# Patient Record
Sex: FEMALE | Race: WHITE | NOT HISPANIC OR LATINO | Employment: OTHER | URBAN - METROPOLITAN AREA
[De-identification: names, ages, dates, MRNs, and addresses within clinical notes are randomized per-mention and may not be internally consistent; named-entity substitution may affect disease eponyms.]

---

## 2018-12-07 ENCOUNTER — HOSPITAL ENCOUNTER (OUTPATIENT)
Facility: MEDICAL CENTER | Age: 58
End: 2018-12-07

## 2021-04-12 ENCOUNTER — APPOINTMENT (RX ONLY)
Dept: URBAN - METROPOLITAN AREA CLINIC 35 | Facility: CLINIC | Age: 61
Setting detail: DERMATOLOGY
End: 2021-04-12

## 2021-04-12 DIAGNOSIS — D18.0 HEMANGIOMA: ICD-10-CM

## 2021-04-12 DIAGNOSIS — D485 NEOPLASM OF UNCERTAIN BEHAVIOR OF SKIN: ICD-10-CM

## 2021-04-12 DIAGNOSIS — Z71.89 OTHER SPECIFIED COUNSELING: ICD-10-CM

## 2021-04-12 DIAGNOSIS — L72.8 OTHER FOLLICULAR CYSTS OF THE SKIN AND SUBCUTANEOUS TISSUE: ICD-10-CM

## 2021-04-12 DIAGNOSIS — D22 MELANOCYTIC NEVI: ICD-10-CM

## 2021-04-12 DIAGNOSIS — L81.4 OTHER MELANIN HYPERPIGMENTATION: ICD-10-CM

## 2021-04-12 DIAGNOSIS — L82.1 OTHER SEBORRHEIC KERATOSIS: ICD-10-CM

## 2021-04-12 PROBLEM — D18.01 HEMANGIOMA OF SKIN AND SUBCUTANEOUS TISSUE: Status: ACTIVE | Noted: 2021-04-12

## 2021-04-12 PROBLEM — D48.5 NEOPLASM OF UNCERTAIN BEHAVIOR OF SKIN: Status: ACTIVE | Noted: 2021-04-12

## 2021-04-12 PROBLEM — D22.61 MELANOCYTIC NEVI OF RIGHT UPPER LIMB, INCLUDING SHOULDER: Status: ACTIVE | Noted: 2021-04-12

## 2021-04-12 PROCEDURE — 11102 TANGNTL BX SKIN SINGLE LES: CPT

## 2021-04-12 PROCEDURE — 11103 TANGNTL BX SKIN EA SEP/ADDL: CPT

## 2021-04-12 PROCEDURE — 99203 OFFICE O/P NEW LOW 30 MIN: CPT | Mod: 25

## 2021-04-12 PROCEDURE — ? SUNSCREEN RECOMMENDATIONS

## 2021-04-12 PROCEDURE — ? COUNSELING

## 2021-04-12 PROCEDURE — ? BIOPSY BY SHAVE METHOD

## 2021-04-12 ASSESSMENT — LOCATION SIMPLE DESCRIPTION DERM
LOCATION SIMPLE: CHEST
LOCATION SIMPLE: RIGHT SHOULDER
LOCATION SIMPLE: RIGHT UPPER BACK
LOCATION SIMPLE: RIGHT BREAST

## 2021-04-12 ASSESSMENT — LOCATION DETAILED DESCRIPTION DERM
LOCATION DETAILED: LEFT MEDIAL SUPERIOR CHEST
LOCATION DETAILED: RIGHT MEDIAL BREAST 1-2:00 REGION
LOCATION DETAILED: RIGHT SUPERIOR MEDIAL UPPER BACK
LOCATION DETAILED: RIGHT POSTERIOR SHOULDER

## 2021-04-12 ASSESSMENT — LOCATION ZONE DERM
LOCATION ZONE: ARM
LOCATION ZONE: TRUNK

## 2021-04-12 NOTE — PROCEDURE: BIOPSY BY SHAVE METHOD
Detail Level: Simple
Depth Of Biopsy: dermis
Was A Bandage Applied: Yes
Size Of Lesion In Cm: 0
Biopsy Type: H and E
Biopsy Method: Dermablade
Anesthesia Type: 1% lidocaine with 1:100,000 epinephrine and a 1:12 solution of 8.4% sodium bicarbonate
Anesthesia Volume In Cc: 0.8
Hemostasis: Aluminum Chloride
Wound Care: Petrolatum
Dressing: no dressing applied
Destruction After The Procedure: No
Type Of Destruction Used: Curettage
Curettage Text: The wound bed was treated with curettage after the biopsy was performed.
Electrodesiccation And Curettage Text: The wound bed was treated with electrodesiccation and curettage after the biopsy was
Lab: [id number]
Lab Facility: 
Consent: Written consent was obtained and risks were reviewed including but not limited to scarring, infection, bleeding, scabbing, incomplete removal, nerve damage and allergy to anesthesia.
Post-Care Instructions: I reviewed with the patient in detail post-care instructions. Patient is to keep the biopsy site dry overnight, and then apply white petrolatum twice daily until healed. Patient may apply hydrogen peroxide soaks to remove any crusting.
Notification Instructions: Patient will be notified of biopsy results. However, patient instructed to call the office if not contacted within 2 weeks.
Billing Type: Third-Party Bill
Information: Selecting Yes will display possible errors in your note based on the variables you have selected. This validation is only offered as a suggestion for you. PLEASE NOTE THAT THE VALIDATION TEXT WILL BE REMOVED WHEN YOU FINALIZE YOUR NOTE. IF YOU WANT TO FAX A PRELIMINARY NOTE YOU WILL NEED TO TOGGLE THIS TO 'NO' IF YOU DO NOT WANT IT IN YOUR FAXED NOTE.
Anesthesia Volume In Cc: 1

## 2022-06-20 ENCOUNTER — APPOINTMENT (RX ONLY)
Dept: URBAN - METROPOLITAN AREA CLINIC 35 | Facility: CLINIC | Age: 62
Setting detail: DERMATOLOGY
End: 2022-06-20

## 2022-06-20 DIAGNOSIS — D18.0 HEMANGIOMA: ICD-10-CM

## 2022-06-20 DIAGNOSIS — L82.1 OTHER SEBORRHEIC KERATOSIS: ICD-10-CM

## 2022-06-20 DIAGNOSIS — L57.0 ACTINIC KERATOSIS: ICD-10-CM

## 2022-06-20 DIAGNOSIS — Z71.89 OTHER SPECIFIED COUNSELING: ICD-10-CM

## 2022-06-20 DIAGNOSIS — L72.8 OTHER FOLLICULAR CYSTS OF THE SKIN AND SUBCUTANEOUS TISSUE: ICD-10-CM

## 2022-06-20 DIAGNOSIS — D22 MELANOCYTIC NEVI: ICD-10-CM

## 2022-06-20 DIAGNOSIS — L81.4 OTHER MELANIN HYPERPIGMENTATION: ICD-10-CM

## 2022-06-20 PROBLEM — D18.01 HEMANGIOMA OF SKIN AND SUBCUTANEOUS TISSUE: Status: ACTIVE | Noted: 2022-06-20

## 2022-06-20 PROBLEM — D22.61 MELANOCYTIC NEVI OF RIGHT UPPER LIMB, INCLUDING SHOULDER: Status: ACTIVE | Noted: 2022-06-20

## 2022-06-20 PROCEDURE — ? ADDITIONAL NOTES

## 2022-06-20 PROCEDURE — 17003 DESTRUCT PREMALG LES 2-14: CPT

## 2022-06-20 PROCEDURE — ? COUNSELING

## 2022-06-20 PROCEDURE — ? SUNSCREEN RECOMMENDATIONS

## 2022-06-20 PROCEDURE — ? LIQUID NITROGEN

## 2022-06-20 PROCEDURE — 99213 OFFICE O/P EST LOW 20 MIN: CPT | Mod: 25

## 2022-06-20 PROCEDURE — 17000 DESTRUCT PREMALG LESION: CPT

## 2022-06-20 ASSESSMENT — LOCATION ZONE DERM
LOCATION ZONE: TRUNK
LOCATION ZONE: NOSE
LOCATION ZONE: ARM

## 2022-06-20 ASSESSMENT — LOCATION SIMPLE DESCRIPTION DERM
LOCATION SIMPLE: NOSE
LOCATION SIMPLE: RIGHT SHOULDER
LOCATION SIMPLE: RIGHT UPPER BACK
LOCATION SIMPLE: RIGHT NOSE

## 2022-06-20 ASSESSMENT — LOCATION DETAILED DESCRIPTION DERM
LOCATION DETAILED: RIGHT NASAL SIDEWALL
LOCATION DETAILED: RIGHT SUPERIOR MEDIAL UPPER BACK
LOCATION DETAILED: RIGHT POSTERIOR SHOULDER
LOCATION DETAILED: NASAL DORSUM

## 2022-06-20 NOTE — PROCEDURE: LIQUID NITROGEN
Detail Level: Detailed
Post-Care Instructions: I reviewed with the patient in detail post-care instructions. Patient is to wear sunprotection, and avoid picking at any of the treated lesions. Pt may apply Vaseline to crusted or scabbing areas.
Show Applicator Variable?: Yes
Number Of Freeze-Thaw Cycles: 2 freeze-thaw cycles
Consent: The patient's consent was obtained including but not limited to risks of crusting, scabbing, blistering, scarring, darker or lighter pigmentary change, recurrence, incomplete removal and infection.
Render Post-Care Instructions In Note?: no
Duration Of Freeze Thaw-Cycle (Seconds): 2

## 2022-10-09 ENCOUNTER — APPOINTMENT (OUTPATIENT)
Dept: URGENT CARE | Facility: CLINIC | Age: 62
End: 2022-10-09
Payer: COMMERCIAL

## 2022-10-10 ENCOUNTER — HOSPITAL ENCOUNTER (OUTPATIENT)
Dept: RADIOLOGY | Facility: MEDICAL CENTER | Age: 62
End: 2022-10-10
Attending: NURSE PRACTITIONER
Payer: COMMERCIAL

## 2022-10-10 ENCOUNTER — OFFICE VISIT (OUTPATIENT)
Dept: URGENT CARE | Facility: PHYSICIAN GROUP | Age: 62
End: 2022-10-10
Payer: COMMERCIAL

## 2022-10-10 VITALS
TEMPERATURE: 97.9 F | RESPIRATION RATE: 16 BRPM | HEIGHT: 69 IN | OXYGEN SATURATION: 92 % | HEART RATE: 82 BPM | BODY MASS INDEX: 28.14 KG/M2 | WEIGHT: 190 LBS | DIASTOLIC BLOOD PRESSURE: 60 MMHG | SYSTOLIC BLOOD PRESSURE: 132 MMHG

## 2022-10-10 DIAGNOSIS — J18.9 PNEUMONIA DUE TO INFECTIOUS ORGANISM, UNSPECIFIED LATERALITY, UNSPECIFIED PART OF LUNG: ICD-10-CM

## 2022-10-10 LAB
EXTERNAL QUALITY CONTROL: NORMAL
INT CON NEG: NEGATIVE
INT CON POS: POSITIVE
SARS-COV+SARS-COV-2 AG RESP QL IA.RAPID: NEGATIVE

## 2022-10-10 PROCEDURE — 94640 AIRWAY INHALATION TREATMENT: CPT | Performed by: NURSE PRACTITIONER

## 2022-10-10 PROCEDURE — 71046 X-RAY EXAM CHEST 2 VIEWS: CPT

## 2022-10-10 PROCEDURE — 87426 SARSCOV CORONAVIRUS AG IA: CPT | Performed by: NURSE PRACTITIONER

## 2022-10-10 PROCEDURE — 99204 OFFICE O/P NEW MOD 45 MIN: CPT | Mod: 25 | Performed by: NURSE PRACTITIONER

## 2022-10-10 RX ORDER — ESCITALOPRAM OXALATE 10 MG/1
10 TABLET ORAL DAILY
COMMUNITY

## 2022-10-10 RX ORDER — PREDNISONE 20 MG/1
40 TABLET ORAL DAILY
Qty: 10 TABLET | Refills: 0 | Status: SHIPPED | OUTPATIENT
Start: 2022-10-10 | End: 2022-10-15

## 2022-10-10 RX ORDER — OMEPRAZOLE 40 MG/1
40 CAPSULE, DELAYED RELEASE ORAL DAILY
COMMUNITY
End: 2023-09-14

## 2022-10-10 RX ORDER — ALBUTEROL SULFATE 90 UG/1
2 AEROSOL, METERED RESPIRATORY (INHALATION) EVERY 6 HOURS PRN
Qty: 8.5 G | Refills: 0 | Status: SHIPPED | OUTPATIENT
Start: 2022-10-10 | End: 2023-09-14

## 2022-10-10 RX ORDER — IPRATROPIUM BROMIDE AND ALBUTEROL SULFATE 2.5; .5 MG/3ML; MG/3ML
3 SOLUTION RESPIRATORY (INHALATION) ONCE
Status: COMPLETED | OUTPATIENT
Start: 2022-10-10 | End: 2022-10-10

## 2022-10-10 RX ORDER — AZITHROMYCIN 250 MG/1
TABLET, FILM COATED ORAL
Qty: 6 TABLET | Refills: 0 | Status: SHIPPED | OUTPATIENT
Start: 2022-10-10 | End: 2023-09-14

## 2022-10-10 RX ADMIN — IPRATROPIUM BROMIDE AND ALBUTEROL SULFATE 3 ML: 2.5; .5 SOLUTION RESPIRATORY (INHALATION) at 16:53

## 2022-10-10 NOTE — PROGRESS NOTES
Patient has consented to treatment and for use of patient information for treatment and billing purposes.    Date: 10/10/22     Arrival Mode: Private Vehicle / Ambulatory    Chief Complaint:    Chief Complaint   Patient presents with    Cough     X 6 days with congestion        History of Present Illness: 62 y.o. female  presents to clinic with 6-day history of cough and congestion.  Patient states she does believe that she had fevers at the start of the illness although she is unsure at this time.  Patient does admit to chills body aches and diaphoresis.  Denies any pleuritic-like pain.  Denies chest pain severe shortness of breath or lower leg swelling.  Patient denies any calf pain.  Denies nausea vomiting diarrhea.  Patient has been using over-the-counter cold medications with no alleviation of symptoms.  Admits to wheezing and shortness of breath.  No history of COPD or asthma.      ROS:  As stated in HPI     Pertinent Medical History:    History reviewed. No pertinent past medical history.     Pertinent Surgical History:    History reviewed. No pertinent surgical history.     Current  Medications:  Current Outpatient Medications on File Prior to Visit   Medication Sig Dispense Refill    escitalopram (LEXAPRO) 10 MG Tab Take 10 mg by mouth every day.      omeprazole (PRILOSEC) 40 MG delayed-release capsule Take 40 mg by mouth every day.       No current facility-administered medications on file prior to visit.        Allergies:     Patient has no known allergies.     Social History:   Social History     Socioeconomic History    Marital status:      Spouse name: Not on file    Number of children: Not on file    Years of education: Not on file    Highest education level: Not on file   Occupational History    Not on file   Tobacco Use    Smoking status: Not on file    Smokeless tobacco: Not on file   Substance and Sexual Activity    Alcohol use: Not on file    Drug use: Not on file    Sexual activity: Not  on file   Other Topics Concern    Not on file   Social History Narrative    Not on file     Social Determinants of Health     Financial Resource Strain: Not on file   Food Insecurity: Not on file   Transportation Needs: Not on file   Physical Activity: Not on file   Stress: Not on file   Social Connections: Not on file   Intimate Partner Violence: Not on file   Housing Stability: Not on file        No LMP recorded.       Physical Exam:  Vitals:    10/10/22 1213   BP: 132/60   Pulse: 82   Resp: 16   Temp: 36.6 °C (97.9 °F)   SpO2: 92%        Physical Exam  Vitals reviewed.   Constitutional:       General: She is not in acute distress.     Appearance: Normal appearance. She is well-developed. She is not toxic-appearing.   HENT:      Head: Normocephalic and atraumatic.      Right Ear: Tympanic membrane, ear canal and external ear normal.      Left Ear: Tympanic membrane, ear canal and external ear normal.      Nose: Congestion and rhinorrhea present.      Mouth/Throat:      Lips: Pink.      Mouth: Mucous membranes are moist.      Pharynx: Posterior oropharyngeal erythema present.   Eyes:      General: Lids are normal. Gaze aligned appropriately. No allergic shiner or scleral icterus.     Extraocular Movements: Extraocular movements intact.      Conjunctiva/sclera: Conjunctivae normal.      Pupils: Pupils are equal, round, and reactive to light.   Cardiovascular:      Rate and Rhythm: Normal rate and regular rhythm.      Pulses:           Radial pulses are 2+ on the right side and 2+ on the left side.      Heart sounds: Normal heart sounds.   Pulmonary:      Effort: Pulmonary effort is normal.      Breath sounds: Examination of the right-upper field reveals wheezing. Examination of the left-upper field reveals wheezing. Examination of the right-middle field reveals wheezing. Examination of the left-middle field reveals wheezing. Examination of the right-lower field reveals decreased breath sounds and rhonchi.  Examination of the left-lower field reveals decreased breath sounds and rhonchi. Decreased breath sounds, wheezing and rhonchi present.   Musculoskeletal:      Right lower leg: No edema.      Left lower leg: No edema.   Lymphadenopathy:      Cervical: No cervical adenopathy.   Skin:     General: Skin is warm.      Capillary Refill: Capillary refill takes less than 2 seconds.      Coloration: Skin is not cyanotic or pale.      Findings: No rash.   Neurological:      Mental Status: She is alert.      Gait: Gait is intact.   Psychiatric:         Behavior: Behavior normal. Behavior is cooperative.        Diagnostics:    Covid 19 negative     DX-CHEST-2 VIEWS    Result Date: 10/10/2022  10/10/2022 1:04 PM HISTORY/REASON FOR EXAM:  Cough, chest congestion TECHNIQUE/EXAM DESCRIPTION AND NUMBER OF VIEWS: Two views of the chest. COMPARISON:  None. FINDINGS: LUNGS: Clear. No effusions. PNEUMOTHORAX: None. LINES AND TUBES: None. MEDIASTINUM: No cardiomegaly. MUSCULOSKELETAL STRUCTURES: No acute displaced fracture. Prior cholecystectomy.     No acute cardiopulmonary abnormality.       Medical Decision Making:  I personally reviewed prior external notes and test results pertinent to today's visit.  Patient has had pneumonia and acute respiratory failure prior.  Shared decision-making was utilized with patient did develop treatment plan and clinic course. Charlie, 62 y.o. female 6-day history of cough.  Discussed differentials to include viral URI, bronchitis and/or pneumonia.  Did give in clinic DuoNeb which did improve patient's subjective work of breathing.  Did also slightly improved breath sounds.  Although chest x-ray is negative for acute cardiopulmonary abnormality due to exam findings will treat for presumed pneumonia at this time.  Will send for prednisone azithromycin as well as an albuterol inhaler.  Encourage deep breathing exercises.    Did advise patient on conservative measures for management of symptoms.   Patient is agreeable to pursue adequate rest, adequate hydration, saltwater gargle and Neti pot for any symptoms of upper respiratory congestion.  Over-the-counter analgesia and antipyretics on a p.r.n. basis as needed for pain and fever.  Did discuss over-the-counter cough medications.      Patient will monitor symptoms closely for worsening and is advised to seek further evaluation the emergency room if alarm signs or symptoms arise.  Patient states understanding and verbalizes agreement with this plan of care.    Plan:    1. Pneumonia due to infectious organism, unspecified laterality, unspecified part of lung    - POCT SARS-COV Antigen PANTERA (Symptomatic only)  - DX-CHEST-2 VIEWS  - ipratropium-albuterol (DUONEB) nebulizer solution  - azithromycin (ZITHROMAX) 250 MG Tab; Take 2 tablets by mouth on day one. Take one tablet by mouth the remaining days until gone  Dispense: 6 Tablet; Refill: 0  - predniSONE (DELTASONE) 20 MG Tab; Take 2 Tablets by mouth every day for 5 days.  Dispense: 10 Tablet; Refill: 0  - albuterol 108 (90 Base) MCG/ACT Aero Soln inhalation aerosol; Inhale 2 Puffs every 6 hours as needed for Shortness of Breath.  Dispense: 8.5 g; Refill: 0         Disposition:  Patient was discharged in stable condition.    Voice Recognition Disclaimer: Portions of this document were created using voice recognition software. The software does have a chance of producing errors of grammar and possibly content. I have made every reasonable attempt to correct obvious errors, but there may be errors of grammar and possibly content that I did not discover before finalizing the documentation.    Ashley Ortiz, A.P.R.N.

## 2022-10-10 NOTE — LETTER
October 10, 2022    To Whom It May Concern:         This is confirmation that Jennifer ABDIAZIZ Mejia attended her scheduled appointment with MAGED Christian on 10/10/22. Please excuse form work 10/10-10/12/22. May return 10/13/22. Pt may still cough but is not thought to be contagious.          If you have any questions please do not hesitate to call me at the phone number listed below.    Sincerely,          WILDER ChristianRJulietteN.  [phone]

## 2022-11-27 ENCOUNTER — HOSPITAL ENCOUNTER (OUTPATIENT)
Dept: RADIOLOGY | Facility: MEDICAL CENTER | Age: 62
End: 2022-11-27
Attending: INTERNAL MEDICINE
Payer: COMMERCIAL

## 2022-11-27 ENCOUNTER — OFFICE VISIT (OUTPATIENT)
Dept: URGENT CARE | Facility: PHYSICIAN GROUP | Age: 62
End: 2022-11-27
Payer: COMMERCIAL

## 2022-11-27 VITALS
HEART RATE: 88 BPM | TEMPERATURE: 97.6 F | DIASTOLIC BLOOD PRESSURE: 74 MMHG | BODY MASS INDEX: 29.62 KG/M2 | SYSTOLIC BLOOD PRESSURE: 122 MMHG | HEIGHT: 69 IN | WEIGHT: 200 LBS | OXYGEN SATURATION: 93 % | RESPIRATION RATE: 16 BRPM

## 2022-11-27 DIAGNOSIS — J20.8 ACUTE BRONCHITIS DUE TO OTHER SPECIFIED ORGANISMS: ICD-10-CM

## 2022-11-27 DIAGNOSIS — J10.1 INFLUENZA A: ICD-10-CM

## 2022-11-27 LAB
EXTERNAL QUALITY CONTROL: NORMAL
FLUAV+FLUBV AG SPEC QL IA: POSITIVE
INT CON NEG: NORMAL
INT CON NEG: NORMAL
INT CON POS: NORMAL
INT CON POS: NORMAL
SARS-COV+SARS-COV-2 AG RESP QL IA.RAPID: NEGATIVE

## 2022-11-27 PROCEDURE — 99213 OFFICE O/P EST LOW 20 MIN: CPT | Performed by: INTERNAL MEDICINE

## 2022-11-27 PROCEDURE — 71046 X-RAY EXAM CHEST 2 VIEWS: CPT

## 2022-11-27 PROCEDURE — 87804 INFLUENZA ASSAY W/OPTIC: CPT | Performed by: INTERNAL MEDICINE

## 2022-11-27 PROCEDURE — 87426 SARSCOV CORONAVIRUS AG IA: CPT | Performed by: INTERNAL MEDICINE

## 2022-11-27 RX ORDER — ESTRADIOL 0.1 MG/D
1 FILM, EXTENDED RELEASE TRANSDERMAL
COMMUNITY

## 2022-11-27 RX ORDER — OSELTAMIVIR PHOSPHATE 75 MG/1
75 CAPSULE ORAL 2 TIMES DAILY
Qty: 10 CAPSULE | Refills: 0 | Status: SHIPPED | OUTPATIENT
Start: 2022-11-27 | End: 2023-09-14

## 2022-11-27 ASSESSMENT — ENCOUNTER SYMPTOMS
CHILLS: 0
SHORTNESS OF BREATH: 1
HEMOPTYSIS: 0
FEVER: 0
SPUTUM PRODUCTION: 1
COUGH: 1
WHEEZING: 0

## 2022-11-27 NOTE — PROGRESS NOTES
Chief Complaint:      HPI:      Jennifer Mejia is a 62 y.o. female with the productive cough since Thursday.  She was diagnosed with acute bronchitis last month and completed a Z-Suleiman.  Her chest x-ray was normal.  She reports that she initially got better after her first treatment but then her cough  has gotten worse.  She denies fevers and chills but reports slight shortness of breath.   She denies a history of COPD and congestive heart failure.          ROS:   Review of Systems   Constitutional:  Negative for chills and fever.   Respiratory:  Positive for cough, sputum production and shortness of breath. Negative for hemoptysis and wheezing.       Past Medical History:  She There are no problems to display for this patient.    Past Surgical History:  She No past surgical history on file.   Allergies:  She Patient has no known allergies.   Current Medications:  She   Current Outpatient Medications:     estradiol (VIVELLE-DOT) 0.1 MG/24HR PATCH BIWEEKLY, Place 1 Patch on the skin two times a week., Disp: , Rfl:     escitalopram (LEXAPRO) 10 MG Tab, Take 10 mg by mouth every day., Disp: , Rfl:     omeprazole (PRILOSEC) 40 MG delayed-release capsule, Take 40 mg by mouth every day. (Patient not taking: Reported on 11/27/2022), Disp: , Rfl:     azithromycin (ZITHROMAX) 250 MG Tab, Take 2 tablets by mouth on day one. Take one tablet by mouth the remaining days until gone (Patient not taking: Reported on 11/27/2022), Disp: 6 Tablet, Rfl: 0    albuterol 108 (90 Base) MCG/ACT Aero Soln inhalation aerosol, Inhale 2 Puffs every 6 hours as needed for Shortness of Breath. (Patient not taking: Reported on 11/27/2022), Disp: 8.5 g, Rfl: 0  Social History:  She   Social History     Socioeconomic History    Marital status:      Spouse name: Not on file    Number of children: Not on file    Years of education: Not on file    Highest education level: Not on file   Occupational History    Not on file   Tobacco Use    Smoking  "status: Not on file    Smokeless tobacco: Not on file   Substance and Sexual Activity    Alcohol use: Not on file    Drug use: Not on file    Sexual activity: Not on file   Other Topics Concern    Not on file   Social History Narrative    Not on file     Social Determinants of Health     Financial Resource Strain: Not on file   Food Insecurity: Not on file   Transportation Needs: Not on file   Physical Activity: Not on file   Stress: Not on file   Social Connections: Not on file   Intimate Partner Violence: Not on file   Housing Stability: Not on file      Family History:   Her No family history on file.     PHYSICAL EXAM:    Vital signs: /74   Pulse 88   Temp 36.4 °C (97.6 °F) (Temporal)   Resp 16   Ht 1.753 m (5' 9\")   Wt 90.7 kg (200 lb)   SpO2 93%   BMI 29.53 kg/m²   Physical Exam  HENT:      Mouth/Throat:      Mouth: Mucous membranes are moist.      Pharynx: Oropharynx is clear.   Eyes:      Extraocular Movements: Extraocular movements intact.      Conjunctiva/sclera: Conjunctivae normal.      Pupils: Pupils are equal, round, and reactive to light.   Cardiovascular:      Rate and Rhythm: Normal rate and regular rhythm.      Pulses: Normal pulses.      Heart sounds: Normal heart sounds.   Pulmonary:      Effort: Pulmonary effort is normal.      Breath sounds: Rales present.      Comments: Positive for rales or fine inspiratory crackles on the right lower chest  Musculoskeletal:      Cervical back: Normal range of motion and neck supple.   Neurological:      Mental Status: She is alert.       Labs:  No results found for: HBA1C   Lab Results   Component Value Date/Time    CHOLSTRLTOT 224 (H) 10/26/2016 0935    TRIGLYCERIDE 129 10/26/2016 0935    HDL 66 10/26/2016 0935     (H) 10/26/2016 0935     No results found for: MICROALBCALC, MALBCRT, MALBEXCR, VCDPTY45, MICROALBUR, MICRALB, UMICROALBUM, MICROALBTIM   Lab Results   Component Value Date/Time    CREATININE 0.75 10/26/2016 09:35 AM       "     ASSESSMENT/PLAN:   1. Influenza A  Patient is positive for influenza A.  Because of her symptoms I am starting her on Tamiflu  Reviewed the side effects of the medication alternatives  Reviewed proper administration  I am also concerned because her oxygen saturation is low I get a chest x-ray to make sure that she does not have bacterial pneumonia on top of the influenza  I will update her on the results of her x-ray      Return if symptoms worsen or fail to improve.       This patient during there office visit was started on new medication.  Side effects of new medications were discussed with the patient today in the office. The patient was supplied paperwork on this new medication.    Red flags discussed and when to RTC or seek care in the ER  Supportive care, differential diagnoses, and indications for immediate follow-up discussed with patient.    Pathogenesis of diagnosis discussed including typical length and natural progression.       Instructed to return to  or nearest emergency department if symptoms fail to improve, for any change in condition, further concerns, or new concerning symptoms.  Patient states understanding of the plan of care and discharge instructions.      Eric Zamora MD, FACE, ECNU  11/27/22

## 2022-11-27 NOTE — LETTER
November 27, 2022    To Whom It May Concern:         This is confirmation that Jennifer Mejia attended her scheduled appointment with Eric Zamora M.D. on 11/27/22.  Please excuse her from work 11/28/2022 to 12/1/2022         If you have any questions please do not hesitate to call me at the phone number listed below.    Sincerely,          Eric Zamora M.D.  [phone]

## 2023-06-27 ENCOUNTER — APPOINTMENT (RX ONLY)
Dept: URBAN - METROPOLITAN AREA CLINIC 35 | Facility: CLINIC | Age: 63
Setting detail: DERMATOLOGY
End: 2023-06-27

## 2023-06-27 DIAGNOSIS — D22 MELANOCYTIC NEVI: ICD-10-CM

## 2023-06-27 DIAGNOSIS — L82.1 OTHER SEBORRHEIC KERATOSIS: ICD-10-CM

## 2023-06-27 DIAGNOSIS — L81.4 OTHER MELANIN HYPERPIGMENTATION: ICD-10-CM

## 2023-06-27 DIAGNOSIS — L82.0 INFLAMED SEBORRHEIC KERATOSIS: ICD-10-CM

## 2023-06-27 DIAGNOSIS — L72.8 OTHER FOLLICULAR CYSTS OF THE SKIN AND SUBCUTANEOUS TISSUE: ICD-10-CM

## 2023-06-27 DIAGNOSIS — D18.0 HEMANGIOMA: ICD-10-CM

## 2023-06-27 DIAGNOSIS — Z71.89 OTHER SPECIFIED COUNSELING: ICD-10-CM

## 2023-06-27 PROBLEM — D22.72 MELANOCYTIC NEVI OF LEFT LOWER LIMB, INCLUDING HIP: Status: ACTIVE | Noted: 2023-06-27

## 2023-06-27 PROBLEM — D18.01 HEMANGIOMA OF SKIN AND SUBCUTANEOUS TISSUE: Status: ACTIVE | Noted: 2023-06-27

## 2023-06-27 PROBLEM — D22.61 MELANOCYTIC NEVI OF RIGHT UPPER LIMB, INCLUDING SHOULDER: Status: ACTIVE | Noted: 2023-06-27

## 2023-06-27 PROCEDURE — ? ADDITIONAL NOTES

## 2023-06-27 PROCEDURE — ? COUNSELING

## 2023-06-27 PROCEDURE — 99213 OFFICE O/P EST LOW 20 MIN: CPT | Mod: 25

## 2023-06-27 PROCEDURE — 17110 DESTRUCTION B9 LES UP TO 14: CPT | Mod: 52

## 2023-06-27 PROCEDURE — ? OBSERVATION AND MEASURE

## 2023-06-27 PROCEDURE — ? SUNSCREEN RECOMMENDATIONS

## 2023-06-27 PROCEDURE — ? LIQUID NITROGEN

## 2023-06-27 ASSESSMENT — LOCATION ZONE DERM
LOCATION ZONE: ARM
LOCATION ZONE: TOE
LOCATION ZONE: TRUNK
LOCATION ZONE: EAR

## 2023-06-27 ASSESSMENT — LOCATION DETAILED DESCRIPTION DERM
LOCATION DETAILED: RIGHT POSTERIOR SHOULDER
LOCATION DETAILED: RIGHT SUPERIOR MEDIAL UPPER BACK
LOCATION DETAILED: RIGHT SCAPHA
LOCATION DETAILED: LEFT MEDIAL 5TH TOE

## 2023-06-27 ASSESSMENT — LOCATION SIMPLE DESCRIPTION DERM
LOCATION SIMPLE: RIGHT SHOULDER
LOCATION SIMPLE: RIGHT EAR
LOCATION SIMPLE: RIGHT UPPER BACK
LOCATION SIMPLE: LEFT 5TH TOE

## 2023-06-27 NOTE — PROCEDURE: LIQUID NITROGEN
Detail Level: Detailed
Show Aperture Variable?: Yes
Number Of Freeze-Thaw Cycles: 2 freeze-thaw cycles
Consent: The patient's consent was obtained including but not limited to risks of crusting, scabbing, blistering, scarring, darker or lighter pigmentary change, recurrence, incomplete removal and infection.
Medical Necessity Information: It is in your best interest to select a reason for this procedure from the list below. All of these items fulfill various CMS LCD requirements except the new and changing color options.
Spray Paint Technique: No
Post-Care Instructions: I reviewed with the patient in detail post-care instructions. Patient is to wear sunprotection, and avoid picking at any of the treated lesions. Pt may apply Vaseline to crusted or scabbing areas.
Medical Necessity Clause: This procedure was medically necessary because the lesions that were treated were:
Spray Paint Text: The liquid nitrogen was applied to the skin utilizing a spray paint frosting technique.

## 2023-08-22 ENCOUNTER — TELEPHONE (OUTPATIENT)
Dept: SCHEDULING | Facility: IMAGING CENTER | Age: 63
End: 2023-08-22

## 2023-09-08 ENCOUNTER — OFFICE VISIT (OUTPATIENT)
Dept: URGENT CARE | Facility: PHYSICIAN GROUP | Age: 63
End: 2023-09-08
Payer: COMMERCIAL

## 2023-09-08 VITALS
SYSTOLIC BLOOD PRESSURE: 118 MMHG | RESPIRATION RATE: 16 BRPM | HEIGHT: 69 IN | DIASTOLIC BLOOD PRESSURE: 64 MMHG | BODY MASS INDEX: 29.44 KG/M2 | OXYGEN SATURATION: 96 % | HEART RATE: 70 BPM | WEIGHT: 198.8 LBS | TEMPERATURE: 97.1 F

## 2023-09-08 DIAGNOSIS — R05.1 ACUTE COUGH: ICD-10-CM

## 2023-09-08 DIAGNOSIS — J06.9 VIRAL URI WITH COUGH: ICD-10-CM

## 2023-09-08 DIAGNOSIS — R68.89 FLU-LIKE SYMPTOMS: ICD-10-CM

## 2023-09-08 LAB
FLUAV RNA SPEC QL NAA+PROBE: NEGATIVE
FLUBV RNA SPEC QL NAA+PROBE: NEGATIVE
RSV RNA SPEC QL NAA+PROBE: NEGATIVE
SARS-COV-2 RNA RESP QL NAA+PROBE: NEGATIVE

## 2023-09-08 PROCEDURE — 0241U POCT CEPHEID COV-2, FLU A/B, RSV - PCR: CPT | Performed by: NURSE PRACTITIONER

## 2023-09-08 PROCEDURE — 3078F DIAST BP <80 MM HG: CPT | Performed by: NURSE PRACTITIONER

## 2023-09-08 PROCEDURE — 99213 OFFICE O/P EST LOW 20 MIN: CPT | Performed by: NURSE PRACTITIONER

## 2023-09-08 PROCEDURE — 3074F SYST BP LT 130 MM HG: CPT | Performed by: NURSE PRACTITIONER

## 2023-09-08 RX ORDER — BENZONATATE 100 MG/1
100 CAPSULE ORAL 3 TIMES DAILY PRN
Qty: 30 CAPSULE | Refills: 0 | Status: SHIPPED | OUTPATIENT
Start: 2023-09-08 | End: 2023-09-20

## 2023-09-08 RX ORDER — M-VIT,TX,IRON,MINS/CALC/FOLIC 27MG-0.4MG
1 TABLET ORAL DAILY
COMMUNITY
End: 2023-09-20

## 2023-09-08 ASSESSMENT — ENCOUNTER SYMPTOMS
SHORTNESS OF BREATH: 0
HEADACHES: 1
SORE THROAT: 1
CARDIOVASCULAR NEGATIVE: 1
SPUTUM PRODUCTION: 0
WHEEZING: 0
GASTROINTESTINAL NEGATIVE: 1
CHILLS: 1
COUGH: 1
SWEATS: 1
FEVER: 0
MYALGIAS: 1

## 2023-09-08 NOTE — PROGRESS NOTES
Subjective     Jennifer Mejia is a 63 y.o. female who presents with Congestion (Sx present x3d.), Cough, Body Aches, and Sweats (Night sweats)            Cough  Associated symptoms include chills, headaches, myalgias, a sore throat and sweats. Pertinent negatives include no fever, shortness of breath or wheezing.   Sweats  Associated symptoms include chills, congestion, coughing, headaches, myalgias and a sore throat. Pertinent negatives include no fever.   States has been experiencing flulike symptoms x3 days.  Denies fever but does have chills, night sweats, body aches, feeling fatigue, dry cough, postnasal drainage.  Taking ibuprofen.  No salt water gargle.  Denies nausea, vomiting, diarrhea.    PMH:  has no past medical history on file.  MEDS:   Current Outpatient Medications:     therapeutic multivitamin-minerals (THERAGRAN-M) Tab, Take 1 Tablet by mouth every day., Disp: , Rfl:     benzonatate (TESSALON) 100 MG Cap, Take 1 Capsule by mouth 3 times a day as needed for Cough., Disp: 30 Capsule, Rfl: 0    estradiol (VIVELLE-DOT) 0.1 MG/24HR PATCH BIWEEKLY, Place 1 Patch on the skin two times a week., Disp: , Rfl:     escitalopram (LEXAPRO) 10 MG Tab, Take 10 mg by mouth every day., Disp: , Rfl:     oseltamivir (TAMIFLU) 75 MG Cap, Take 1 Capsule by mouth 2 times a day. (Patient not taking: Reported on 9/8/2023), Disp: 10 Capsule, Rfl: 0    omeprazole (PRILOSEC) 40 MG delayed-release capsule, Take 40 mg by mouth every day. (Patient not taking: Reported on 11/27/2022), Disp: , Rfl:     azithromycin (ZITHROMAX) 250 MG Tab, Take 2 tablets by mouth on day one. Take one tablet by mouth the remaining days until gone (Patient not taking: Reported on 11/27/2022), Disp: 6 Tablet, Rfl: 0    albuterol 108 (90 Base) MCG/ACT Aero Soln inhalation aerosol, Inhale 2 Puffs every 6 hours as needed for Shortness of Breath. (Patient not taking: Reported on 11/27/2022), Disp: 8.5 g, Rfl: 0  ALLERGIES: No Known Allergies  SURGHX:  "History reviewed. No pertinent surgical history.  SOCHX:  reports that she has never smoked. She has never used smokeless tobacco.  FH: Family history was reviewed, no pertinent findings to report      Review of Systems   Constitutional:  Positive for chills and malaise/fatigue. Negative for fever.   HENT:  Positive for congestion and sore throat.    Respiratory:  Positive for cough. Negative for sputum production, shortness of breath and wheezing.    Cardiovascular: Negative.    Gastrointestinal: Negative.    Musculoskeletal:  Positive for myalgias.   Neurological:  Positive for headaches.   All other systems reviewed and are negative.             Objective     /64 (BP Location: Left arm, Patient Position: Sitting, BP Cuff Size: Adult long)   Pulse 70   Temp 36.2 °C (97.1 °F) (Temporal)   Resp 16   Ht 1.753 m (5' 9\")   Wt 90.2 kg (198 lb 12.8 oz)   SpO2 96%   BMI 29.36 kg/m²      Physical Exam  Vitals reviewed.   Constitutional:       General: She is awake. She is not in acute distress.     Appearance: She is well-developed. She is not ill-appearing, toxic-appearing or diaphoretic.      Comments: Appears fatigued   HENT:      Head: Normocephalic.      Right Ear: Ear canal and external ear normal. A middle ear effusion is present.      Left Ear: Ear canal and external ear normal. A middle ear effusion is present.      Nose: Congestion and rhinorrhea present.      Mouth/Throat:      Lips: Pink.      Mouth: Mucous membranes are dry.      Pharynx: Oropharynx is clear. Uvula midline. No pharyngeal swelling, oropharyngeal exudate, posterior oropharyngeal erythema or uvula swelling.   Eyes:      Conjunctiva/sclera: Conjunctivae normal.   Cardiovascular:      Rate and Rhythm: Normal rate.   Pulmonary:      Effort: Pulmonary effort is normal.      Breath sounds: Normal breath sounds and air entry.      Comments: Dry bronchial cough without bronchospasm    Musculoskeletal:         General: Normal range of " motion.      Cervical back: Normal range of motion and neck supple.   Skin:     General: Skin is warm and dry.   Neurological:      Mental Status: She is alert and oriented to person, place, and time.   Psychiatric:         Attention and Perception: Attention normal.         Mood and Affect: Mood normal.         Speech: Speech normal.         Behavior: Behavior normal. Behavior is cooperative.                             Assessment & Plan        1. Flu-like symptoms    - POCT CoV-2, Flu A/B, RSV by PCR    2. Acute cough    - benzonatate (TESSALON) 100 MG Cap; Take 1 Capsule by mouth 3 times a day as needed for Cough.  Dispense: 30 Capsule; Refill: 0    3. Viral URI with cough        -Maintain hydration/water intake  -May use over the counter Ibuprofen/Tylenol as needed for fever  -May use humidifier/vaporizer at home as needed for dry cough/nasal passages  -Gargle salt water or throat lozenges as needed for throat irritation/dry cough  -Get rest  -May use daily longer acting antihistamine as needed (no decongestant) for any post nasal drainage   -May use saline nasal spray/Flonase as needed to flush any nasal congestion/post nasal drainage   -Monitor for fevers, worse cough, phlegm, shortness of breath, wheeze, chest tightness- need re-evaluation

## 2023-09-14 ENCOUNTER — OFFICE VISIT (OUTPATIENT)
Dept: URGENT CARE | Facility: PHYSICIAN GROUP | Age: 63
End: 2023-09-14
Payer: COMMERCIAL

## 2023-09-14 VITALS
DIASTOLIC BLOOD PRESSURE: 64 MMHG | RESPIRATION RATE: 18 BRPM | HEIGHT: 69 IN | HEART RATE: 84 BPM | OXYGEN SATURATION: 97 % | WEIGHT: 195 LBS | BODY MASS INDEX: 28.88 KG/M2 | SYSTOLIC BLOOD PRESSURE: 104 MMHG | TEMPERATURE: 98.2 F

## 2023-09-14 DIAGNOSIS — F17.200 SMOKER: ICD-10-CM

## 2023-09-14 DIAGNOSIS — J40 BRONCHITIS: ICD-10-CM

## 2023-09-14 PROCEDURE — 3074F SYST BP LT 130 MM HG: CPT | Performed by: PHYSICIAN ASSISTANT

## 2023-09-14 PROCEDURE — 3078F DIAST BP <80 MM HG: CPT | Performed by: PHYSICIAN ASSISTANT

## 2023-09-14 PROCEDURE — 99213 OFFICE O/P EST LOW 20 MIN: CPT | Performed by: PHYSICIAN ASSISTANT

## 2023-09-14 RX ORDER — ALBUTEROL SULFATE 90 UG/1
2 AEROSOL, METERED RESPIRATORY (INHALATION) EVERY 6 HOURS PRN
Qty: 8.5 G | Refills: 0 | Status: SHIPPED | OUTPATIENT
Start: 2023-09-14 | End: 2024-03-20

## 2023-09-14 RX ORDER — PREDNISONE 20 MG/1
40 TABLET ORAL DAILY
Qty: 10 TABLET | Refills: 0 | Status: SHIPPED | OUTPATIENT
Start: 2023-09-14 | End: 2023-09-19

## 2023-09-14 NOTE — PROGRESS NOTES
"Subjective:   Jennifer Mejia is a 63 y.o. female who presents for Congestion (States she was seen here last week and states her cough has gotten worse. )      HPI  The patient presents to the Urgent Care with complaints of a cough onset over 10 days ago.  Was evaluated here in the urgent care on 9/8 and was tested negative for COVID, influenza, RSV.  Treated with Tessalon Perles and recommended symptomatic and supportive care.  Cough has remained the same.  No worsening.  Occasional fatigue and body aches.  Hot and cold chills.  Some diarrhea after eating.  Denies any recent fever, chest pain, SOB, vomiting. Smoked 1/2 pack per day before illness. Smokes 2-3 cigarettes per day now.     No past medical history on file.  No Known Allergies     Objective:     /64 (BP Location: Left arm, Patient Position: Sitting, BP Cuff Size: Adult long)   Pulse 84   Temp 36.8 °C (98.2 °F) (Temporal)   Resp 18   Ht 1.753 m (5' 9\")   Wt 88.5 kg (195 lb)   SpO2 97%   BMI 28.80 kg/m²     Physical Exam  Vitals reviewed.   Constitutional:       General: She is not in acute distress.     Appearance: Normal appearance. She is not ill-appearing or toxic-appearing.   HENT:      Mouth/Throat:      Mouth: Mucous membranes are moist.      Pharynx: Oropharynx is clear.   Eyes:      Conjunctiva/sclera: Conjunctivae normal.   Cardiovascular:      Rate and Rhythm: Normal rate and regular rhythm.      Heart sounds: Normal heart sounds.   Pulmonary:      Effort: Pulmonary effort is normal. No respiratory distress.      Breath sounds: Wheezing (slight expiratory to left upper lung) present. No rhonchi or rales.   Musculoskeletal:      Cervical back: Neck supple.   Skin:     General: Skin is warm and dry.   Neurological:      General: No focal deficit present.      Mental Status: She is alert and oriented to person, place, and time.   Psychiatric:         Mood and Affect: Mood normal.         Behavior: Behavior normal.         Diagnosis and " associated orders:     1. Bronchitis  - predniSONE (DELTASONE) 20 MG Tab; Take 2 Tablets by mouth every day for 5 days.  Dispense: 10 Tablet; Refill: 0  - albuterol 108 (90 Base) MCG/ACT Aero Soln inhalation aerosol; Inhale 2 Puffs every 6 hours as needed for Shortness of Breath.  Dispense: 8.5 g; Refill: 0    2. Smoker       Comments/MDM:     They have a normal pulse oximetry on room air, afebrile, and a normal pulmonary exam. Overall, the patient is very well appearing. I do not feel that this patient would benefit from antibiotics at this time.   Treatment as above  Recommended symptomatic and supportive care at this time that includes plenty of fluids, rest, Tylenol/Ibuprofen for pain/fever, warm salt water gargles for sore throat, OTC cough and decongestant medication, Flonase, nasal saline washes. If no improvement in 5-7 days or any worsening symptoms, recommend returning to the urgent care for re-evaluation.      I personally reviewed prior external notes and test results pertinent to today's visit. Pathogenesis of diagnosis discussed including typical length and natural progression. Supportive care, natural history, differential diagnoses, and indications for immediate follow-up discussed. Patient expresses understanding and agrees to plan. Patient denies any other questions or concerns.     Follow-up with the primary care physician for recheck, reevaluation, and consideration of further management.    Please note that this dictation was created using voice recognition software. I have made a reasonable attempt to correct obvious errors, but I expect that there are errors of grammar and possibly content that I did not discover before finalizing the note.    This note was electronically signed by Bonilla Nicole PA-C

## 2023-09-19 SDOH — HEALTH STABILITY: MENTAL HEALTH
STRESS IS WHEN SOMEONE FEELS TENSE, NERVOUS, ANXIOUS, OR CAN'T SLEEP AT NIGHT BECAUSE THEIR MIND IS TROUBLED. HOW STRESSED ARE YOU?: ONLY A LITTLE

## 2023-09-19 SDOH — ECONOMIC STABILITY: FOOD INSECURITY: WITHIN THE PAST 12 MONTHS, THE FOOD YOU BOUGHT JUST DIDN'T LAST AND YOU DIDN'T HAVE MONEY TO GET MORE.: NEVER TRUE

## 2023-09-19 SDOH — ECONOMIC STABILITY: TRANSPORTATION INSECURITY
IN THE PAST 12 MONTHS, HAS THE LACK OF TRANSPORTATION KEPT YOU FROM MEDICAL APPOINTMENTS OR FROM GETTING MEDICATIONS?: NO

## 2023-09-19 SDOH — ECONOMIC STABILITY: TRANSPORTATION INSECURITY
IN THE PAST 12 MONTHS, HAS LACK OF RELIABLE TRANSPORTATION KEPT YOU FROM MEDICAL APPOINTMENTS, MEETINGS, WORK OR FROM GETTING THINGS NEEDED FOR DAILY LIVING?: NO

## 2023-09-19 SDOH — ECONOMIC STABILITY: INCOME INSECURITY: IN THE LAST 12 MONTHS, WAS THERE A TIME WHEN YOU WERE NOT ABLE TO PAY THE MORTGAGE OR RENT ON TIME?: NO

## 2023-09-19 SDOH — HEALTH STABILITY: PHYSICAL HEALTH: ON AVERAGE, HOW MANY DAYS PER WEEK DO YOU ENGAGE IN MODERATE TO STRENUOUS EXERCISE (LIKE A BRISK WALK)?: 5 DAYS

## 2023-09-19 SDOH — ECONOMIC STABILITY: TRANSPORTATION INSECURITY
IN THE PAST 12 MONTHS, HAS LACK OF TRANSPORTATION KEPT YOU FROM MEETINGS, WORK, OR FROM GETTING THINGS NEEDED FOR DAILY LIVING?: NO

## 2023-09-19 SDOH — ECONOMIC STABILITY: HOUSING INSECURITY
IN THE LAST 12 MONTHS, WAS THERE A TIME WHEN YOU DID NOT HAVE A STEADY PLACE TO SLEEP OR SLEPT IN A SHELTER (INCLUDING NOW)?: NO

## 2023-09-19 SDOH — HEALTH STABILITY: PHYSICAL HEALTH: ON AVERAGE, HOW MANY MINUTES DO YOU ENGAGE IN EXERCISE AT THIS LEVEL?: 60 MIN

## 2023-09-19 SDOH — ECONOMIC STABILITY: FOOD INSECURITY: WITHIN THE PAST 12 MONTHS, YOU WORRIED THAT YOUR FOOD WOULD RUN OUT BEFORE YOU GOT MONEY TO BUY MORE.: NEVER TRUE

## 2023-09-19 SDOH — ECONOMIC STABILITY: HOUSING INSECURITY

## 2023-09-19 SDOH — ECONOMIC STABILITY: INCOME INSECURITY: HOW HARD IS IT FOR YOU TO PAY FOR THE VERY BASICS LIKE FOOD, HOUSING, MEDICAL CARE, AND HEATING?: NOT HARD AT ALL

## 2023-09-19 ASSESSMENT — SOCIAL DETERMINANTS OF HEALTH (SDOH)
HOW OFTEN DO YOU HAVE SIX OR MORE DRINKS ON ONE OCCASION: NEVER
HOW OFTEN DO YOU GET TOGETHER WITH FRIENDS OR RELATIVES?: TWICE A WEEK
HOW OFTEN DO YOU ATTEND CHURCH OR RELIGIOUS SERVICES?: PATIENT DECLINED
WITHIN THE PAST 12 MONTHS, YOU WORRIED THAT YOUR FOOD WOULD RUN OUT BEFORE YOU GOT THE MONEY TO BUY MORE: NEVER TRUE
HOW OFTEN DO YOU ATTEND CHURCH OR RELIGIOUS SERVICES?: PATIENT DECLINED
DO YOU BELONG TO ANY CLUBS OR ORGANIZATIONS SUCH AS CHURCH GROUPS UNIONS, FRATERNAL OR ATHLETIC GROUPS, OR SCHOOL GROUPS?: PATIENT DECLINED
HOW OFTEN DO YOU ATTENT MEETINGS OF THE CLUB OR ORGANIZATION YOU BELONG TO?: PATIENT DECLINED
HOW OFTEN DO YOU ATTENT MEETINGS OF THE CLUB OR ORGANIZATION YOU BELONG TO?: PATIENT DECLINED
DO YOU BELONG TO ANY CLUBS OR ORGANIZATIONS SUCH AS CHURCH GROUPS UNIONS, FRATERNAL OR ATHLETIC GROUPS, OR SCHOOL GROUPS?: PATIENT DECLINED
HOW HARD IS IT FOR YOU TO PAY FOR THE VERY BASICS LIKE FOOD, HOUSING, MEDICAL CARE, AND HEATING?: NOT HARD AT ALL
HOW OFTEN DO YOU GET TOGETHER WITH FRIENDS OR RELATIVES?: TWICE A WEEK
HOW OFTEN DO YOU HAVE A DRINK CONTAINING ALCOHOL: PATIENT DECLINED
HOW MANY DRINKS CONTAINING ALCOHOL DO YOU HAVE ON A TYPICAL DAY WHEN YOU ARE DRINKING: PATIENT DECLINED

## 2023-09-19 ASSESSMENT — LIFESTYLE VARIABLES
AUDIT-C TOTAL SCORE: -1
HOW MANY STANDARD DRINKS CONTAINING ALCOHOL DO YOU HAVE ON A TYPICAL DAY: PATIENT DECLINED
SKIP TO QUESTIONS 9-10: 0
HOW OFTEN DO YOU HAVE A DRINK CONTAINING ALCOHOL: PATIENT DECLINED
HOW OFTEN DO YOU HAVE SIX OR MORE DRINKS ON ONE OCCASION: NEVER

## 2023-09-20 ENCOUNTER — OFFICE VISIT (OUTPATIENT)
Dept: MEDICAL GROUP | Facility: MEDICAL CENTER | Age: 63
End: 2023-09-20
Payer: COMMERCIAL

## 2023-09-20 VITALS
DIASTOLIC BLOOD PRESSURE: 78 MMHG | WEIGHT: 203.48 LBS | TEMPERATURE: 98.3 F | SYSTOLIC BLOOD PRESSURE: 114 MMHG | HEIGHT: 69 IN | HEART RATE: 85 BPM | BODY MASS INDEX: 30.14 KG/M2 | OXYGEN SATURATION: 95 %

## 2023-09-20 DIAGNOSIS — Z72.0 TOBACCO USE: ICD-10-CM

## 2023-09-20 DIAGNOSIS — Z00.00 WELLNESS EXAMINATION: ICD-10-CM

## 2023-09-20 DIAGNOSIS — J01.00 ACUTE NON-RECURRENT MAXILLARY SINUSITIS: ICD-10-CM

## 2023-09-20 PROCEDURE — 3078F DIAST BP <80 MM HG: CPT | Performed by: STUDENT IN AN ORGANIZED HEALTH CARE EDUCATION/TRAINING PROGRAM

## 2023-09-20 PROCEDURE — 99214 OFFICE O/P EST MOD 30 MIN: CPT | Performed by: STUDENT IN AN ORGANIZED HEALTH CARE EDUCATION/TRAINING PROGRAM

## 2023-09-20 PROCEDURE — 3074F SYST BP LT 130 MM HG: CPT | Performed by: STUDENT IN AN ORGANIZED HEALTH CARE EDUCATION/TRAINING PROGRAM

## 2023-09-20 RX ORDER — BENZONATATE 100 MG/1
100 CAPSULE ORAL 3 TIMES DAILY PRN
Qty: 30 CAPSULE | Refills: 0 | Status: SHIPPED | OUTPATIENT
Start: 2023-09-20 | End: 2024-03-20

## 2023-09-20 RX ORDER — AZITHROMYCIN 250 MG/1
250 TABLET, FILM COATED ORAL DAILY
Qty: 6 TABLET | Refills: 0 | Status: SHIPPED | OUTPATIENT
Start: 2023-09-20 | End: 2024-03-20

## 2023-09-20 ASSESSMENT — PATIENT HEALTH QUESTIONNAIRE - PHQ9: CLINICAL INTERPRETATION OF PHQ2 SCORE: 0

## 2023-09-20 NOTE — PROGRESS NOTES
"Subjective:     Chief Complaint   Patient presents with    Establish Care    Cough    Headache    Sinus Problem       HPI:   Jennifer presents today to establish care.  Previous PCP Dr. Pena.    Urgent care follow-up  Patient seen in urgent care 9/8 and 9/14 for flulike symptoms.  9/8 labs negative for COVID, influenza and RSV.  Patient advised to continue with symptomatic treatment.  Patient returned to urgent care 9/14 stating that symptoms have gotten worse.  Patient treated for bronchitis with prednisone, albuterol.    Patient presents today, 6 days later, continuing to have cough, headache, sinus pressure and pain.  Patient feeling slightly improved.  Patient notes that she does continue on Claritin, no improvement in cough or drainage.  Continues to have cough productive of green mucus.    Patient has been under high stress, notes recently her mother passed away and child was getting .  Patient notes she is finally able to rest.      Tobacco use  Patient notes a pack per day history 20 years patient is a pack per day history of approximately 22 years.  Patient notes that she smoked for 20 years, quit for 11 and restarted about 2 years ago.  Patient is interested in quitting.    Menopause   patient does continue to follow with GYN for menopause.  Patient is taking Lexapro 10 mg and estradiol patch.    Patient notes a strong family history of a clotting disorder.  Patient is unsure which clotting disorder.  Patient has never had a blood clot herself.    Mammogram up-to-date-  Pap [id number]  Colonoscopy up-to-date-  Need to request records from GYN.        ROS:  Gen: no fevers/chills  Pulm: no sob, no cough  CV: no chest pain, no palpitations  GI: no nausea/vomiting, no diarrhea      Objective:     Exam:  /78 (BP Location: Left arm, Patient Position: Sitting)   Pulse 85   Temp 36.8 °C (98.3 °F) (Temporal)   Ht 1.753 m (5' 9\")   Wt 92.3 kg (203 lb 7.8 oz)   SpO2 95%   BMI 30.05 kg/m²  " Body mass index is 30.05 kg/m².    Gen: Alert and oriented, No apparent distress.  Neck: Neck is supple without lymphadenopathy.  Lungs: Normal effort, CTA bilaterally, no wheezes, rhonchi, or rales  CV: Regular rate and rhythm. No murmurs, rubs, or gallops.  Ext: No clubbing, cyanosis, edema.      Assessment & Plan:     63 y.o. female with the following -     1. Acute non-recurrent maxillary sinusitis  benzonatate (TESSALON) 100 MG Cap    azithromycin (ZITHROMAX) 250 MG Tab      2. Wellness examination  CBC WITHOUT DIFFERENTIAL    Comp Metabolic Panel    Lipid Profile    TSH WITH REFLEX TO FT4      3. Tobacco use               No follow-ups on file.    Please note that this dictation was created using voice recognition software. I have made every reasonable attempt to correct obvious errors, but I expect that there are errors of grammar and possibly content that I did not discover before finalizing the note.

## 2024-01-12 ENCOUNTER — DOCUMENTATION (OUTPATIENT)
Dept: HEALTH INFORMATION MANAGEMENT | Facility: OTHER | Age: 64
End: 2024-01-12
Payer: COMMERCIAL

## 2024-02-15 ENCOUNTER — OFFICE VISIT (OUTPATIENT)
Dept: URGENT CARE | Facility: PHYSICIAN GROUP | Age: 64
End: 2024-02-15
Payer: COMMERCIAL

## 2024-02-15 VITALS
HEART RATE: 85 BPM | TEMPERATURE: 97.7 F | WEIGHT: 190 LBS | OXYGEN SATURATION: 96 % | SYSTOLIC BLOOD PRESSURE: 118 MMHG | HEIGHT: 69 IN | RESPIRATION RATE: 20 BRPM | BODY MASS INDEX: 28.14 KG/M2 | DIASTOLIC BLOOD PRESSURE: 80 MMHG

## 2024-02-15 DIAGNOSIS — J01.00 ACUTE NON-RECURRENT MAXILLARY SINUSITIS: Primary | ICD-10-CM

## 2024-02-15 PROCEDURE — 3074F SYST BP LT 130 MM HG: CPT | Performed by: PHYSICIAN ASSISTANT

## 2024-02-15 PROCEDURE — 99213 OFFICE O/P EST LOW 20 MIN: CPT | Performed by: PHYSICIAN ASSISTANT

## 2024-02-15 PROCEDURE — 3079F DIAST BP 80-89 MM HG: CPT | Performed by: PHYSICIAN ASSISTANT

## 2024-02-15 RX ORDER — AMOXICILLIN AND CLAVULANATE POTASSIUM 875; 125 MG/1; MG/1
1 TABLET, FILM COATED ORAL 2 TIMES DAILY
Qty: 14 TABLET | Refills: 0 | Status: SHIPPED | OUTPATIENT
Start: 2024-02-15 | End: 2024-02-22

## 2024-02-15 NOTE — PROGRESS NOTES
"Subjective:   Jennifer Mejia is a 63 y.o. female who presents for Sinus Problem (3 weeks)      HPI  The patient presents to the Urgent Care with complaints of possible sinus infection.  Over a month ago she had a viral URI followed by sinus symptoms for about 3 weeks.  Reports of sinus pain and discomfort to her maxillary sinuses, nasal congestion, runny nose, postnasal drip cough.  She has tried Ana pot.  Denies any fever, chills, chest pain, shortness of breath.      No past medical history on file.  No Known Allergies     Objective:     /80   Pulse 85   Temp 36.5 °C (97.7 °F) (Temporal)   Resp 20   Ht 1.753 m (5' 9\")   Wt 86.2 kg (190 lb)   SpO2 96%   BMI 28.06 kg/m²     Physical Exam  Vitals reviewed.   Constitutional:       General: She is not in acute distress.     Appearance: Normal appearance. She is not ill-appearing or toxic-appearing.   HENT:      Right Ear: Tympanic membrane normal.      Left Ear: Tympanic membrane normal.      Nose: Mucosal edema and rhinorrhea present. Rhinorrhea is purulent.      Right Sinus: No maxillary sinus tenderness or frontal sinus tenderness.      Left Sinus: No maxillary sinus tenderness or frontal sinus tenderness.      Mouth/Throat:      Mouth: Mucous membranes are moist.      Pharynx: Oropharynx is clear. No oropharyngeal exudate or posterior oropharyngeal erythema.   Eyes:      Conjunctiva/sclera: Conjunctivae normal.   Cardiovascular:      Rate and Rhythm: Normal rate and regular rhythm.      Heart sounds: Normal heart sounds.   Pulmonary:      Effort: Pulmonary effort is normal.      Breath sounds: Normal breath sounds.   Musculoskeletal:      Cervical back: Neck supple. No rigidity.   Skin:     General: Skin is warm and dry.   Neurological:      General: No focal deficit present.      Mental Status: She is alert and oriented to person, place, and time.   Psychiatric:         Mood and Affect: Mood normal.         Behavior: Behavior normal. "         Diagnosis and associated orders:     1. Acute non-recurrent maxillary sinusitis  - amoxicillin-clavulanate (AUGMENTIN) 875-125 MG Tab; Take 1 Tablet by mouth 2 times a day for 7 days.  Dispense: 14 Tablet; Refill: 0       Comments/MDM:     Patient's presenting symptoms and exam findings are consistent with sinusitis. Due to duration, symptoms, and exam findings, I feel it is appropriate to initiate antibiotic therapy.   Start Augmentin.   I recommend plenty of fluids, Flonase nasal spray, nasal saline washes or janie pot, Mucinex decongestant, Ibuprofen or Tylenol for pain, non-drowsy antihistamine such as Zyrtec or Claritin.        I personally reviewed prior external notes and test results pertinent to today's visit. Pathogenesis of diagnosis discussed including typical length and natural progression. Supportive care, natural history, differential diagnoses, and indications for immediate follow-up discussed. Patient expresses understanding and agrees to plan. Patient denies any other questions or concerns.     Follow-up with the primary care physician for recheck, reevaluation, and consideration of further management.    Please note that this dictation was created using voice recognition software. I have made a reasonable attempt to correct obvious errors, but I expect that there are errors of grammar and possibly content that I did not discover before finalizing the note.    This note was electronically signed by Bonilla Nicole PA-C

## 2024-03-20 ENCOUNTER — RESEARCH ENCOUNTER (OUTPATIENT)
Dept: RESEARCH | Facility: MEDICAL CENTER | Age: 64
End: 2024-03-20

## 2024-03-20 ENCOUNTER — OFFICE VISIT (OUTPATIENT)
Dept: MEDICAL GROUP | Facility: MEDICAL CENTER | Age: 64
End: 2024-03-20
Payer: COMMERCIAL

## 2024-03-20 ENCOUNTER — HOSPITAL ENCOUNTER (OUTPATIENT)
Facility: MEDICAL CENTER | Age: 64
End: 2024-03-20
Attending: FAMILY MEDICINE
Payer: COMMERCIAL

## 2024-03-20 VITALS
BODY MASS INDEX: 29.92 KG/M2 | HEART RATE: 80 BPM | SYSTOLIC BLOOD PRESSURE: 112 MMHG | RESPIRATION RATE: 16 BRPM | HEIGHT: 69 IN | OXYGEN SATURATION: 93 % | WEIGHT: 202 LBS | DIASTOLIC BLOOD PRESSURE: 80 MMHG | TEMPERATURE: 98.3 F

## 2024-03-20 DIAGNOSIS — Z11.59 NEED FOR HEPATITIS C SCREENING TEST: ICD-10-CM

## 2024-03-20 DIAGNOSIS — Z11.4 ENCOUNTER FOR SCREENING FOR HIV: ICD-10-CM

## 2024-03-20 DIAGNOSIS — Z12.31 ENCOUNTER FOR SCREENING MAMMOGRAM FOR BREAST CANCER: ICD-10-CM

## 2024-03-20 DIAGNOSIS — F40.243 ANXIETY WITH FLYING: ICD-10-CM

## 2024-03-20 DIAGNOSIS — Z79.890 POSTMENOPAUSAL HRT (HORMONE REPLACEMENT THERAPY): ICD-10-CM

## 2024-03-20 DIAGNOSIS — E78.5 DYSLIPIDEMIA: ICD-10-CM

## 2024-03-20 DIAGNOSIS — R73.01 ELEVATED FASTING GLUCOSE: ICD-10-CM

## 2024-03-20 DIAGNOSIS — Z91.89 AT RISK FOR BREAST CANCER: ICD-10-CM

## 2024-03-20 DIAGNOSIS — Z87.891 FORMER SMOKER: ICD-10-CM

## 2024-03-20 PROBLEM — Z72.89: Status: ACTIVE | Noted: 2023-09-20

## 2024-03-20 PROCEDURE — 3079F DIAST BP 80-89 MM HG: CPT | Performed by: FAMILY MEDICINE

## 2024-03-20 PROCEDURE — 3074F SYST BP LT 130 MM HG: CPT | Performed by: FAMILY MEDICINE

## 2024-03-20 PROCEDURE — 99214 OFFICE O/P EST MOD 30 MIN: CPT | Performed by: FAMILY MEDICINE

## 2024-03-20 PROCEDURE — 80307 DRUG TEST PRSMV CHEM ANLYZR: CPT

## 2024-03-20 RX ORDER — LORAZEPAM 0.5 MG/1
.5-1 TABLET ORAL 2 TIMES DAILY PRN
Qty: 8 TABLET | Refills: 0 | Status: SHIPPED | OUTPATIENT
Start: 2024-03-20 | End: 2024-03-29

## 2024-03-20 ASSESSMENT — PATIENT HEALTH QUESTIONNAIRE - PHQ9: CLINICAL INTERPRETATION OF PHQ2 SCORE: 0

## 2024-03-20 NOTE — LETTER
[facility]  Vinny Segundo D.O.  [address]  [place] [address]  Fax: [phone]   Authorization for Release/Disclosure of   Protected Health Information   Name: EMMANUEL CHAVEZ : 1960 SSN: [id number]   Address: [address] [place] [address] Phone:    [phone] (home) [phone] (work)   I authorize the entity listed below to release/disclose the PHI below to:   [facility]/Vinny Segundo D.O. and Vinny Segundo D.O.   Provider or Entity Name:  [facility]   Address   City, [facility], Zip:               [address] [place] [address]   Phone:  [phone]      Fax:      [phone]        Reason for request: continuity of care   Information to be released:    [ X ] LAST COLONOSCOPY,  including any PATH REPORT and follow-up  [ X ] LAST FIT/COLOGUARD RESULT [  ] LAST DEXA  [  ] LAST MAMMOGRAM  [  ] LAST PAP  [  ] LAST LABS [  ] RETINA EXAM REPORT  [  ] IMMUNIZATION RECORDS  [  ] Release all info      [  ] Check here and initial the line next to each item to release ALL health information INCLUDING  _____ Care and treatment for drug and / or alcohol abuse  _____ HIV testing, infection status, or AIDS  _____ Genetic Testing  DATES OF SERVICE OR TIME PERIOD TO BE DISCLOSED: _____________  I understand and acknowledge that:  * This Authorization may be revoked at any time by you in writing, except if your health information has already been used or disclosed.  * Your health information that will be used or disclosed as a result of you signing this authorization could be re-disclosed by the recipient. If this occurs, your re-disclosed health information may no longer be protected by State or Federal laws.  * You may refuse to sign this Authorization. Your refusal will not affect your ability to obtain treatment.  * This Authorization becomes effective upon signing and will  on (date) __________.      If no date is indicated, this Authorization  will  one (1) year from the signature date.    Name: Jennifer Carleejovan Mejia    Signature:   Date:     3/20/2024       PLEASE FAX REQUESTED RECORDS BACK TO: [phone]

## 2024-03-20 NOTE — PROGRESS NOTES
"Verbal consent was acquired by the patient to use [email or web address] ambient listening note generation during this visit     Subjective:     CC: \"establish care\"      History of Present Illness  The patient is a 64-year-old female here to establish care. She is accompanied by her .    She did labs with her gynecologist about 3 weeks to 1 month ago. She was told that she was prediabetic. Her cholesterol was a little high, but she was not too worried about it because she started walking and exercising. She is inquiring about Ozempic. She does Pilates and walking.    She is going on a trip to [place] and [place] at the end of the month. She gets worked up and agitated with flying. She feels like she is not in no control. This has been a lifelong thing for her, more as she has gotten older. She has not taken any medication for anxiety. She has never taken lorazepam or Valium. She takes Lexapro 10 mg daily instead of hormone replacement therapy to control her hot flashes.    She sometimes vapes. She smoked for 35 years, then for 10 years, then started back up for 2 years, and quit on 03/04/2024. She started smoking when she was 17 years old. She smoked 1 pack a day. She quit for 11 years in 2023. She plans to quit. She took Chantix years ago.    Supplemental Information  She had a hysterectomy, gallbladder removal, and 2 C-sections. She is keeping up on her mammograms. She has not had any abnormal mammograms. She had a colonoscopy.   She has 2 children. She drinks alcohol socially. She denies drug use including marijuana.   She has a family history of blood clotting factors in her family. Her mother had dementia, diabetes, and heart disease. Her sister had lung cancer. Her father had cholesterol, stroke, glaucoma, and an aneurysm. Her paternal grandmother and paternal aunt had breast cancer.    Current Outpatient Medications   Medication Sig Refill Last Dispense    escitalopram (LEXAPRO) 10 MG Tab Take 10 mg by mouth " every day.  Unknown (patient-reported)    estradiol (VIVELLE-DOT) 0.1 MG/24HR PATCH BIWEEKLY Place 1 Patch on the skin two times a week.  Unknown (patient-reported)    LORazepam (ATIVAN) 0.5 MG Tab Take 1-2 Tablets by mouth 2 times a day as needed for Anxiety for up to 9 days. Indications: Feeling Anxious 0 Unknown (outside pharmacy)      No Known Allergies     Past Medical History:   Diagnosis Date    Hyperlipidemia       Past Surgical History:   Procedure Laterality Date    ABDOMINAL HYSTERECTOMY TOTAL      CHOLECYSTECTOMY      PRIMARY C SECTION      x2      Family History   Problem Relation Age of Onset    Heart Disease Mother     Dementia Mother     Diabetes Mother     Breast Cancer Paternal Grandmother     Hyperlipidemia Father     Stroke Father     Glaucoma Father     Arterial Aneurysm Father     Glaucoma Paternal Grandfather     Cancer Sister         Lung cancer    Hypertension Sister     Breast Cancer Paternal Aunt     Psychiatric Illness Brother         Bi-Polar    Hyperlipidemia Brother       Social History     Socioeconomic History    Marital status:     Highest education level: Some college, no degree   Tobacco Use    Smoking status: Former     Current packs/day: 0.00     Average packs/day: 1 pack/day for 35.2 years (35.2 ttl pk-yrs)     Types: Cigarettes     Start date:      Quit date: 3/3/2024     Years since quittin.0    Smokeless tobacco: Never    Tobacco comments:     Stopped for 10 years and started again, and now quit again   Vaping Use    Vaping Use: Some days    Substances: Flavoring   Substance and Sexual Activity    Alcohol use: Yes     Comment: Occasionally    Drug use: Never    Sexual activity: Yes     Partners: Male     Birth control/protection: Post-Menopausal     Social Determinants of Health     Financial Resource Strain: Low Risk  (2023)    Overall Financial Resource Strain (CARDIA)     Difficulty of Paying Living Expenses: Not hard at all   Food Insecurity: No Food  "Insecurity (9/19/2023)    Hunger Vital Sign     Worried About Running Out of Food in the Last Year: Never true     Ran Out of Food in the Last Year: Never true   Transportation Needs: No Transportation Needs (9/19/2023)    PRAPARE - Transportation     Lack of Transportation (Medical): No     Lack of Transportation (Non-Medical): No   Physical Activity: Sufficiently Active (9/19/2023)    Exercise Vital Sign     Days of Exercise per Week: 5 days     Minutes of Exercise per Session: 60 min   Stress: No Stress Concern Present (9/19/2023)    [employer] - Occupational Stress Questionnaire     Feeling of Stress : Only a little   Social Connections: Unknown (9/19/2023)    Social Connection and Isolation Panel [NHANES]     Frequency of Social Gatherings with Friends and Family: Twice a week     Attends [employer] Services: Patient declined     Active Member of Clubs or Organizations: Patient declined     Attends Club or Organization Meetings: Patient declined     Marital Status:    Housing Stability: Unknown (9/19/2023)    Housing Stability Vital Sign     Unable to Pay for Housing in the Last Year: No     Unstable Housing in the Last Year: No        Objective:     Exam:  /80   Pulse 80   Temp 36.8 °C (98.3 °F) (Temporal)   Resp 16   Ht 1.753 m (5' 9\")   Wt 91.6 kg (202 lb)   SpO2 93%   Breastfeeding No   BMI 29.83 kg/m²  Body mass index is 29.83 kg/m².    Physical Exam  Vitals reviewed.   Constitutional:       General: She is not in acute distress.     Appearance: Normal appearance.   HENT:      Head: Normocephalic and atraumatic.   Cardiovascular:      Rate and Rhythm: Normal rate and regular rhythm.      Heart sounds: Normal heart sounds.   Pulmonary:      Effort: Pulmonary effort is normal.      Breath sounds: Normal breath sounds.   Skin:     General: Skin is warm and dry.   Neurological:      Mental Status: She is alert. Mental status is at baseline.   Psychiatric:       "   Mood and Affect: Mood normal.         Behavior: Behavior normal.           Results        Assessment & Plan:       1. Anxiety with flying  - LORazepam (ATIVAN) 0.5 MG Tab; Take 1-2 Tablets by mouth 2 times a day as needed for Anxiety for up to 9 days. Indications: Feeling Anxious  Dispense: 8 Tablet; Refill: 0  - Controlled Substance Treatment Agreement  - URINE DRUG SCREEN; Future  - CBC WITH DIFFERENTIAL; Future  - TSH WITH REFLEX TO FT4; Future    2. Elevated fasting glucose  - Comp Metabolic Panel; Future  - HEMOGLOBIN A1C; Future    3. Dyslipidemia  - Comp Metabolic Panel; Future  - Lipid Profile; Future    4. Former smoker  - REFERRAL TO LUNG CANCER SCREENING PROGRAM  - CBC WITH DIFFERENTIAL; Future    5. Encounter for screening mammogram for breast cancer  - MA-SCREENING MAMMO BILAT W/TOMOSYNTHESIS W/CAD; Future    6. At risk for breast cancer  - Referral to Genetic Research Studies    7. Postmenopausal HRT (hormone replacement therapy)  - Comp Metabolic Panel; Future    8. Encounter for screening for HIV  - HIV AG/AB COMBO ASSAY SCREENING; Future    9. Need for hepatitis C screening test  - HEP C VIRUS ANTIBODY; Future      Assessment & Plan  1. Fear of flying.  I will prescribe lorazepam 0.5 mg 1 to 2 tablets as needed. She was advised not to mix it with alcohol or anything else that sedates her. She will sign the controlled substance agreement.    2. Prediabetes.  We discussed Ozempic. I will recheck her diabetes later this year.    3. Smoking cessation.  She qualifies for lung cancer screening. : I will refer her to Dr. Fields for lung cancer screening. We discussed patches, gums, Wellbutrin, and Chantix.    4. Breast cancer screening.  I will place an order for genetic screening.    Follow-up  I will get some labs later this year and compare.         Return in about 6 months (around 9/20/2024), or if symptoms worsen or fail to improve, for Annual Visit, Lifestyle, Lab F/U.      This note was created  using voice recognition software (Dragon). The accuracy of the dictation is limited by the abilities of the software. I have reviewed the note prior to signing, however some errors in grammar and context are still possible. If you have any questions related to this note please do not hesitate to contact our office.

## 2024-03-20 NOTE — RESEARCH NOTE
Patient has been referred by Vinny Segundo D.O. Sent initial referral follow-up message with instructions to locate and sign consent form(s). The following consent form(s) have been pushed to the patient's MyChart: NEPTALI

## 2024-03-20 NOTE — LETTER
[employer]  Vinny Segundo D.O.  [address] [facility] [address]  [place] [address]  Fax: [phone]   Authorization for Release/Disclosure of   Protected Health Information   Name: JENNIFER MEJIA : 1960 SSN: [id number]   Address: [address] [place] [address] Phone:    [phone] (home) [phone] (work)   I authorize the entity listed below to release/disclose the PHI below to:   [facility]Vinny [facility] and Vinny Segundo D.O.   Provider or Entity Name:  [facility]    Address   City, State, [facility]   Phone:      Fax:     Reason for request: continuity of care   Information to be released:    [  ] LAST COLONOSCOPY,  including any PATH REPORT and follow-up  [  ] LAST FIT/COLOGUARD RESULT [  ] LAST DEXA  [  ] LAST MAMMOGRAM  [  ] LAST PAP  [  ] LAST LABS [  ] RETINA EXAM REPORT  [  ] IMMUNIZATION RECORDS  [ x ] Release all info      [  ] Check here and initial the line next to each item to release ALL health information INCLUDING  _____ Care and treatment for drug and / or alcohol abuse  _____ HIV testing, infection status, or AIDS  _____ Genetic Testing  DATES OF SERVICE OR TIME PERIOD TO BE DISCLOSED: _____________  I understand and acknowledge that:  * This Authorization may be revoked at any time by you in writing, except if your health information has already been used or disclosed.  * Your health information that will be used or disclosed as a result of you signing this authorization could be re-disclosed by the recipient. If this occurs, your re-disclosed health information may no longer be protected by State or Federal laws.  * You may refuse to sign this Authorization. Your refusal will not affect your ability to obtain treatment.  * This Authorization becomes effective upon signing and will  on (date) __________.      If no date is indicated, this Authorization will  one (1) year from the signature date.    Name: Jennifer Mejia  Signature: Date:    3/20/2024     PLEASE FAX REQUESTED RECORDS BACK TO: [phone]

## 2024-03-22 LAB
AMPHET CTO UR CFM-MCNC: NEGATIVE NG/ML
BARBITURATES CTO UR CFM-MCNC: NEGATIVE NG/ML
BENZODIAZ CTO UR CFM-MCNC: NEGATIVE NG/ML
CANNABINOIDS CTO UR CFM-MCNC: NEGATIVE NG/ML
COCAINE CTO UR CFM-MCNC: NEGATIVE NG/ML
CREAT UR-MCNC: 112.2 MG/DL (ref 20–400)
DRUG COMMENT 753798: NORMAL
METHADONE CTO UR CFM-MCNC: NEGATIVE NG/ML
OPIATES CTO UR CFM-MCNC: NEGATIVE NG/ML
PCP CTO UR CFM-MCNC: NEGATIVE NG/ML
PROPOXYPH CTO UR CFM-MCNC: NEGATIVE NG/ML

## 2024-03-26 ENCOUNTER — TELEPHONE (OUTPATIENT)
Dept: HEALTH INFORMATION MANAGEMENT | Facility: OTHER | Age: 64
End: 2024-03-26
Payer: COMMERCIAL

## 2024-03-26 NOTE — TELEPHONE ENCOUNTER
Outcome: Left Message    Please transfer to Patient Outreach Team at [phone] when patient returns call.    Attempt # 1

## 2024-04-08 DIAGNOSIS — Z00.6 RESEARCH STUDY PATIENT: ICD-10-CM

## 2024-04-16 NOTE — PROGRESS NOTES
"Subjective     Jennifer Mejia is a 64 y.o. female who presents with Lung Cancer Screening Program Prescreen and Nicotine Dependence            HPI  Patient seen today for initial lung cancer screening visit. Patient referred by her PCP, Dr. Vinny Segundo.     The patient meets eligibility criteria including age, smoking history (20+ pack years), if former smoker, quit in the last 15 years, and absence of signs or symptoms of lung cancer.    - Age - 64  - Smoking history - Patient has smoked for 35 years at an average of 1 ppd = 35 pack year smoking history.  - Current smoking status - former smoker, quit smoking 3/3/2024  - No symptoms of lung cancer and no previous history of lung cancer     No Known Allergies    Current Outpatient Medications on File Prior to Visit   Medication Sig Dispense Refill    estradiol (VIVELLE-DOT) 0.1 MG/24HR PATCH BIWEEKLY Place 1 Patch on the skin two times a week.      escitalopram (LEXAPRO) 10 MG Tab Take 10 mg by mouth every day.       No current facility-administered medications on file prior to visit.       Review of Systems   Constitutional:  Negative for chills, fever and weight loss.   Respiratory:  Negative for cough, hemoptysis, sputum production, shortness of breath and wheezing.    Cardiovascular:  Negative for chest pain and palpitations.              Objective     /60 (BP Location: Right arm, Patient Position: Sitting, BP Cuff Size: Adult)   Pulse 74   Resp 14   Ht 1.753 m (5' 9\")   Wt 90.7 kg (200 lb)   SpO2 96%   BMI 29.53 kg/m²      Physical Exam  Constitutional:       Appearance: Normal appearance.   Cardiovascular:      Rate and Rhythm: Normal rate and regular rhythm.   Pulmonary:      Effort: Pulmonary effort is normal.      Breath sounds: Normal breath sounds.   Musculoskeletal:         General: No swelling.   Neurological:      Mental Status: She is alert.                     Assessment & Plan        1. Cigarette nicotine dependence in remission  " CT-LUNG CANCER-SCREENING             We conducted a shared decision-making process using a decision aid. We reviewed benefits and harms of screening, including false positives and potential need for additional diagnostic testing, the possibility of over diagnosis, and total radiation exposure.    We discussed the importance of adhering to annual LDCT screening. We also discussed the impact of comorbities on the patient's the ability or willingness to undergo diagnostic procedure(s) and treatment.    Counseling on the importance of maintaining cigarette smoking abstinence if former smoker; or the importance of smoking cessation if current smoker and, if appropriate, furnishing of information about tobacco cessation interventions.    Based on our discussion, we have decided to begin annual lung cancer screening starting now.    Jennifer is interested in participating in research regarding lung cancer screening. Her contact information was given to the research team.       No follow-up needed unless imaging is abnormal

## 2024-04-17 ENCOUNTER — HOSPITAL ENCOUNTER (OUTPATIENT)
Dept: LAB | Facility: MEDICAL CENTER | Age: 64
End: 2024-04-17
Attending: FAMILY MEDICINE
Payer: COMMERCIAL

## 2024-04-17 DIAGNOSIS — Z00.6 RESEARCH STUDY PATIENT: ICD-10-CM

## 2024-04-22 ENCOUNTER — RESEARCH ENCOUNTER (OUTPATIENT)
Dept: RESEARCH | Facility: MEDICAL CENTER | Age: 64
End: 2024-04-22

## 2024-04-22 ENCOUNTER — OFFICE VISIT (OUTPATIENT)
Dept: SLEEP MEDICINE | Facility: MEDICAL CENTER | Age: 64
End: 2024-04-22
Attending: FAMILY MEDICINE
Payer: COMMERCIAL

## 2024-04-22 VITALS
WEIGHT: 200 LBS | DIASTOLIC BLOOD PRESSURE: 60 MMHG | HEIGHT: 69 IN | HEART RATE: 74 BPM | SYSTOLIC BLOOD PRESSURE: 100 MMHG | OXYGEN SATURATION: 96 % | RESPIRATION RATE: 14 BRPM | BODY MASS INDEX: 29.62 KG/M2

## 2024-04-22 DIAGNOSIS — F17.211 CIGARETTE NICOTINE DEPENDENCE IN REMISSION: ICD-10-CM

## 2024-04-22 PROCEDURE — 3074F SYST BP LT 130 MM HG: CPT | Performed by: FAMILY MEDICINE

## 2024-04-22 PROCEDURE — G0296 VISIT TO DETERM LDCT ELIG: HCPCS | Performed by: FAMILY MEDICINE

## 2024-04-22 PROCEDURE — 3078F DIAST BP <80 MM HG: CPT | Performed by: FAMILY MEDICINE

## 2024-04-22 ASSESSMENT — ENCOUNTER SYMPTOMS
WHEEZING: 0
SHORTNESS OF BREATH: 0
WEIGHT LOSS: 0
HEMOPTYSIS: 0
PALPITATIONS: 0
FEVER: 0
SPUTUM PRODUCTION: 0
CHILLS: 0
COUGH: 0

## 2024-04-22 NOTE — Clinical Note
Thank you for referring Jennifer to the Lung Cancer Screening program.  I enrolled her today. I will update you re: abnormal findings. -Dr. Aishwarya Fields

## 2024-04-22 NOTE — RESEARCH NOTE
"[facility]: PROACT LUNG STUDY    PI: Joe Juarez MD  Study Code: PROACT LUNG [id number]  IRB #: [id number]  NCT #: [id number]  Status: Consented/Enrolled  Subject ID#:  [id number]    Date of visit: 22Apr2024  Patient name: Jennifer Mejia    Was informed consent obtained before blood sample collection: YES  Master Informed Consent Version: 1.0  Protocol Version: 1.0     Did the participant consent to the blood samples being used for future research that is not part of this study? Yes    Did the participant consent to the medical information being used for future research that is not part of this study? Yes    Did the participant meet all eligibility criteria? Yes  If \"No\", then please select the category of the criterion not met?     Inclusion criterion violated: n/a    Exclusion criterion violated: n/a    Consent Note:  Participation in the [facility] PROACT Lung clinical trial was discussed with patient today. All aspects of the study purpose and procedures were explained.  Subject was given ample time to review the consent and all questions were answered to their satisfaction. Patient is aware that the clinical trial is voluntary and they may withdraw consent at any time without affecting the level of care they receive. Participant signed consent without coercion and undue influence and was given a copy of the signed consent. No study-related procedures took place prior to consenting and all assessments were conducted per protocol.    Demographics  YOB: 1960   Age at enrollment: 64 y.o.  Sex at birth: female  Ethnicity: Not  or   Race: White  Height: 69in.   Weight: 200lb  Zip Code: [facility]  The highest level of education completed by participant: some college    Blood Sampling  Was the blood collection procedure started? Yes    Were there blood samples collected? Yes    Accession number: [id number]    Date of blood collection: 22Apr2024    Was this a mobile or site phlebotomist? " Site Phlebotomist    Concomitant Medications  Has the participant taken any medications in the 30 days preceding the blood draw: Yes  If yes, please list name of medication, indication and if still on going:   Lexapro - Hormone regimen   Vivelle Dot - Hormone replacement       Adverse Events  Was there any adverse events in the first 24 hours following study blood collection? No       Respiratory Illnesses and Diseases - If yes, include most recent date of diagnosis  Acute respiratory distress syndrome: No   Pneumonia: Yes - 10Oct2022  Alpha-1 antitrypsin deficiency: No   Asthma: No  Chronic obstructive pulmonary disease (COPD): No   Chronic bronchitis: No   Emphysema: No   Bronchiectasis: No   Pulmonary fibrosis: No   Sarcoidosis: No   Silicosis: No   Asbestosis: No   Tuberculosis: No     Chronic Liver and Kidney Diseases - If yes, include date of diagnosis  Chronic Hepatitis B: No   Chronic Hepatitis C: No   Cirrhosis: No   Chronic kidney disease: No     Other Medical History - If yes, include date of diagnosis  HIV: No   COVID-19: No   Type 1 Diabetes: No   Type 2 Diabetes: No   Hypertension: No   Hyperlipidemia: Yes - 20Mar2024  Arrhythmia: No   Stroke: No   Myocardial Infarction: No   Heart Failure (chronic or acute): No   Congenital heart disease: No   Cardiomyopathy: No     History of Cancer  Does the participant have a history of lung cancer that was diagnosed more than five years prior to enrollment: No   When was the last diagnosis of lung cancer? N/A    Does the participant have a history of other cancer that was diagnosed more than five years prior to enrollment: No   If yes, please specify other cancer type and date of diagnosis: N/A    Does the participant have a family history of lung cancer in first-degree relative (mother, father, sister, brother, or son): YES    If yes, please specify how many first degree relatives of the participant have been diagnosed with lung cancer? 1 sister    Does the  participant have a family history of lung cancer, other than first-degree relative (grandparent, grandchild, aunt or uncle): no  If yes, please specify how many first-degree relatives of the participant have been diagnosed with lung cancer? N/A    Smoking History  Cigarette Start Date: 1977    Which of the following describes the participant's cigarette smoking status? Not current but a former smoekr    At what age did the participant start smoking at least once per day? 17    At what age did the participant permanently quit smoking? 64    Was the participant an intermittent smoker (stopped smoking for at least 6 months)? Yes  If 'Yes', how many years in total did the participant not smoke while an intermittent smoker? 11    During those years, on average, how many cigarettes did the participant usually smoke per day? 20    Has the participant ever used any of the following tobacco-related products (cigars, e-cigarettes, smokeless tobacco (including chewing tobacco and snuff), Pipes (including regular pipes; water pipes; or hookahs): No   If yes, please specify which one:     Has the participant ever lived with a smoker?  Yes    Has the participant ever worked in a room or closed space where people were often smoking?  Yes    Alcohol Use  How often does the participant have a drink containing alcohol? (1 drink = 1 glass of wine/beer or 1 shot of liquor): 2 to 4 times per month    How many drinks containing alcohol does the participant have on a typical day? 1 or 2    Occupational Exposure  Has the participant had a tenure of 12 months or more in any of the following industries or occupations? NO    What is the total duration that the participant has worked in the industries or occupations selected above? N/a    Does the participant usually wear a facemask or other respiratory protective equipment for their lungs while working? No     Has the participant experienced any occupational exposure to agents that are  recognized as lung-targeting carcinogens? No      If yes, please provide the duration of exposure in years for each agent identified as a ling-targeting carcinogen: n/a    Environmental Exposure  Has the participant been exposed to environmental hazards such as chemicals, radon, dust, or air pollutants? No   If yes, include exposure type (radon) and duration of exposure: n/a    Previous Chest Imaging Reports Upload  Has the participant undergone any form of chest imaging greater than 12 months prior to enrollment? No     Has the participant undergone any screening LDCT greater than 12 months prior to enrollment? No     Has the participant undergone any Routine Chest CT greater than 12 months prior to enrollment? No     Specify the procedure for previous chest imaging: Other

## 2024-04-25 ENCOUNTER — HOSPITAL ENCOUNTER (OUTPATIENT)
Dept: RADIOLOGY | Facility: MEDICAL CENTER | Age: 64
End: 2024-04-25

## 2024-05-08 ENCOUNTER — PATIENT MESSAGE (OUTPATIENT)
Dept: SLEEP MEDICINE | Facility: MEDICAL CENTER | Age: 64
End: 2024-05-08
Payer: COMMERCIAL

## 2024-05-09 NOTE — PATIENT COMMUNICATION
Called and spoke with RDC. Was able to get pt scheduled sooner to 05/15/24 check in at 1:50pm. At the [address] [place] [address] Location.    Called pt and lm, that I was able to schedule it sooner and that I will send him a [employer] message with more detail information.    [employer] message

## 2024-05-12 LAB
APOB+LDLR+PCSK9 GENE MUT ANL BLD/T: NOT DETECTED
BRCA1+BRCA2 DEL+DUP + FULL MUT ANL BLD/T: NOT DETECTED
MLH1+MSH2+MSH6+PMS2 GN DEL+DUP+FUL M: NOT DETECTED

## 2024-05-21 ENCOUNTER — HOSPITAL ENCOUNTER (OUTPATIENT)
Dept: RADIOLOGY | Facility: MEDICAL CENTER | Age: 64
End: 2024-05-21
Attending: FAMILY MEDICINE
Payer: COMMERCIAL

## 2024-05-28 ENCOUNTER — TELEPHONE (OUTPATIENT)
Dept: SLEEP MEDICINE | Facility: MEDICAL CENTER | Age: 64
End: 2024-05-28
Payer: COMMERCIAL

## 2024-05-28 NOTE — TELEPHONE ENCOUNTER
Phoned patient with results of LDCT exam performed on 5/15/24. She did not answer, so I sent her a [employer] message.    Notified her that the results showed very small nodule(s) at right upper lung and left lower lung (stable since 2019) that had a benign (or non cancer) appearance.    To make sure these nodule(s) are benign, and remain unchanged, we recommend a follow-up low-dose chest CT in 12 months, which will be ordered per PCP    Patient informed of incidental findings of simple benign hepatic cyst at the liver dome, stable mild nodularity of the anterolateral trachea on the left compared to CT chest 2019 with recommendation to follow-up up with PCP.    Referring provider Dr. Segundo was notified of results and incidental findings per this correspondence.    Health maintenance updated and patient sent lung cancer screening result letter.    Results are in the media

## 2024-06-25 ENCOUNTER — APPOINTMENT (RX ONLY)
Dept: URBAN - METROPOLITAN AREA CLINIC 35 | Facility: CLINIC | Age: 64
Setting detail: DERMATOLOGY
End: 2024-06-25

## 2024-06-25 DIAGNOSIS — Z71.89 OTHER SPECIFIED COUNSELING: ICD-10-CM

## 2024-06-25 DIAGNOSIS — L82.1 OTHER SEBORRHEIC KERATOSIS: ICD-10-CM

## 2024-06-25 DIAGNOSIS — D22 MELANOCYTIC NEVI: ICD-10-CM

## 2024-06-25 DIAGNOSIS — D18.0 HEMANGIOMA: ICD-10-CM

## 2024-06-25 DIAGNOSIS — L81.4 OTHER MELANIN HYPERPIGMENTATION: ICD-10-CM

## 2024-06-25 DIAGNOSIS — L72.8 OTHER FOLLICULAR CYSTS OF THE SKIN AND SUBCUTANEOUS TISSUE: ICD-10-CM

## 2024-06-25 PROBLEM — D18.01 HEMANGIOMA OF SKIN AND SUBCUTANEOUS TISSUE: Status: ACTIVE | Noted: 2024-06-25

## 2024-06-25 PROBLEM — D22.61 MELANOCYTIC NEVI OF RIGHT UPPER LIMB, INCLUDING SHOULDER: Status: ACTIVE | Noted: 2024-06-25

## 2024-06-25 PROBLEM — D22.72 MELANOCYTIC NEVI OF LEFT LOWER LIMB, INCLUDING HIP: Status: ACTIVE | Noted: 2024-06-25

## 2024-06-25 PROCEDURE — ? OBSERVATION AND MEASURE

## 2024-06-25 PROCEDURE — ? ADDITIONAL NOTES

## 2024-06-25 PROCEDURE — ? EXTRACTIONS

## 2024-06-25 PROCEDURE — 99213 OFFICE O/P EST LOW 20 MIN: CPT

## 2024-06-25 PROCEDURE — ? SUNSCREEN RECOMMENDATIONS

## 2024-06-25 PROCEDURE — ? COUNSELING

## 2024-06-25 ASSESSMENT — LOCATION SIMPLE DESCRIPTION DERM
LOCATION SIMPLE: RIGHT UPPER BACK
LOCATION SIMPLE: RIGHT SHOULDER
LOCATION SIMPLE: LEFT 5TH TOE

## 2024-06-25 ASSESSMENT — LOCATION ZONE DERM
LOCATION ZONE: TRUNK
LOCATION ZONE: TOE
LOCATION ZONE: ARM

## 2024-06-25 ASSESSMENT — LOCATION DETAILED DESCRIPTION DERM
LOCATION DETAILED: RIGHT SUPERIOR UPPER BACK
LOCATION DETAILED: LEFT MEDIAL 5TH TOE
LOCATION DETAILED: RIGHT POSTERIOR SHOULDER

## 2024-06-25 NOTE — PROCEDURE: ADDITIONAL NOTES
Additional Notes: Will schedule separate appointment for punch bx
Render Risk Assessment In Note?: yes
Detail Level: Generalized

## 2024-06-25 NOTE — PROCEDURE: EXTRACTIONS
Detail Level: Detailed
Post-Care Instructions: I reviewed with the patient in detail post-care instructions. Patient is to keep the treatment areas dry overnight, and then apply bacitracin twice daily until healed. Patient may apply hydrogen peroxide soaks to remove any crusting.
Render Number Of Lesions Treated: no
Acne Type: Comedonal Lesions
Prep Text (Optional): Prior to removal the treatment areas were prepped in the usual fashion.
Consent was obtained and risks were reviewed including but not limited to scarring, infection, bleeding, scabbing, incomplete removal, and allergy to anesthesia.
Extraction Method: 11 blade and q-tip

## 2024-10-08 ENCOUNTER — OFFICE VISIT (OUTPATIENT)
Dept: URGENT CARE | Facility: PHYSICIAN GROUP | Age: 64
End: 2024-10-08
Payer: COMMERCIAL

## 2024-10-08 VITALS
RESPIRATION RATE: 16 BRPM | OXYGEN SATURATION: 93 % | WEIGHT: 208.34 LBS | DIASTOLIC BLOOD PRESSURE: 64 MMHG | HEIGHT: 69 IN | BODY MASS INDEX: 30.86 KG/M2 | TEMPERATURE: 98.5 F | HEART RATE: 81 BPM | SYSTOLIC BLOOD PRESSURE: 112 MMHG

## 2024-10-08 DIAGNOSIS — J01.90 ACUTE BACTERIAL RHINOSINUSITIS: ICD-10-CM

## 2024-10-08 DIAGNOSIS — B96.89 ACUTE BACTERIAL RHINOSINUSITIS: ICD-10-CM

## 2024-10-08 PROCEDURE — 3074F SYST BP LT 130 MM HG: CPT | Performed by: NURSE PRACTITIONER

## 2024-10-08 PROCEDURE — 99213 OFFICE O/P EST LOW 20 MIN: CPT | Performed by: NURSE PRACTITIONER

## 2024-10-08 PROCEDURE — 3078F DIAST BP <80 MM HG: CPT | Performed by: NURSE PRACTITIONER

## 2024-12-02 ENCOUNTER — APPOINTMENT (OUTPATIENT)
Dept: URBAN - METROPOLITAN AREA CLINIC 35 | Facility: CLINIC | Age: 64
Setting detail: DERMATOLOGY
End: 2024-12-02

## 2024-12-02 DIAGNOSIS — D485 NEOPLASM OF UNCERTAIN BEHAVIOR OF SKIN: ICD-10-CM | Status: INADEQUATELY CONTROLLED

## 2024-12-02 DIAGNOSIS — Z71.89 OTHER SPECIFIED COUNSELING: ICD-10-CM

## 2024-12-02 PROBLEM — D48.5 NEOPLASM OF UNCERTAIN BEHAVIOR OF SKIN: Status: ACTIVE | Noted: 2024-12-02

## 2024-12-02 PROCEDURE — 99212 OFFICE O/P EST SF 10 MIN: CPT | Mod: 25

## 2024-12-02 PROCEDURE — ? SUNSCREEN RECOMMENDATIONS

## 2024-12-02 PROCEDURE — ? BIOPSY BY PUNCH METHOD

## 2024-12-02 PROCEDURE — ? COUNSELING

## 2024-12-02 PROCEDURE — 11104 PUNCH BX SKIN SINGLE LESION: CPT

## 2024-12-02 PROCEDURE — ? SURGICAL DECISION MAKING

## 2024-12-02 ASSESSMENT — LOCATION SIMPLE DESCRIPTION DERM: LOCATION SIMPLE: RIGHT UPPER BACK

## 2024-12-02 ASSESSMENT — LOCATION DETAILED DESCRIPTION DERM: LOCATION DETAILED: RIGHT SUPERIOR MEDIAL UPPER BACK

## 2024-12-02 ASSESSMENT — LOCATION ZONE DERM: LOCATION ZONE: TRUNK

## 2024-12-02 NOTE — PROCEDURE: BIOPSY BY PUNCH METHOD
Detail Level: Detailed
Was A Bandage Applied: Yes
Punch Size In Mm: 6
Size Of Lesion In Cm (Optional): 0
Depth Of Punch Biopsy: dermis
Biopsy Type: H and E
Anesthesia Type: 1% lidocaine with epinephrine
Anesthesia Volume In Cc: 3
Hemostasis: Pressure
Epidermal Sutures: 4-0 Nylon
Wound Care: Petrolatum
Dressing: bandage
Suture Removal: 14 days
Patient Will Remove Sutures At Home?: No
Lab: [id number]
Lab Facility: 
Consent: Verbal consent was obtained and risks were reviewed including but not limited to scarring, infection, bleeding, scabbing, incomplete removal, nerve damage and allergy to anesthesia.
Post-Care Instructions: I reviewed with the patient in detail post-care instructions.  Patient to cleanse area with mild soap and water, apply Vaseline to area x 14 days. Patient may apply hydrogen peroxide soaks to remove any crusting.
Home Suture Removal Text: Patient was provided a home suture removal kit and will remove their sutures at home.  If they have any questions or difficulties they will call the office.
Notification Instructions: Patient will be notified of biopsy results. However, patient instructed to call the office if not contacted within 2 weeks.
Billing Type: Third-Party Bill
Information: Selecting Yes will display possible errors in your note based on the variables you have selected. This validation is only offered as a suggestion for you. PLEASE NOTE THAT THE VALIDATION TEXT WILL BE REMOVED WHEN YOU FINALIZE YOUR NOTE. IF YOU WANT TO FAX A PRELIMINARY NOTE YOU WILL NEED TO TOGGLE THIS TO 'NO' IF YOU DO NOT WANT IT IN YOUR FAXED NOTE.

## 2024-12-02 NOTE — PROCEDURE: SURGICAL DECISION MAKING
Complexity (Necessary For Coding; Major - 90 Day Global With Some Exceptions; Minor - 10 Day Global): minor
Discussion: We discussed not only the risks of the procedure but also the likely rivera that further treatment will be required to treat lesion. This could involve another visit here to destroy or excise  lesion, a visit to a Mohs or general or plastic surgeon, scarring, limited  activity, cosmetic imperfections.
Date Of Surgery - Today Or Tomorrow?: today
Initial Decision For Surgery?: Yes
Risk Assessment Explanation (Does Not Render In The Note): Clinical determination of the probability and/or consequences of an event, such as surgery. Clinical assessment of the level of risk is affected by the nature of the event under consideration for the patient. Modifier 57 is used to indicate an Evaluation and Management (E/M) service resulted in the initial decision to perform surgery either the day before a major surgery (90 day global) or the day of a major surgery.

## 2024-12-16 ENCOUNTER — APPOINTMENT (OUTPATIENT)
Dept: URBAN - METROPOLITAN AREA CLINIC 35 | Facility: CLINIC | Age: 64
Setting detail: DERMATOLOGY
End: 2024-12-16

## 2024-12-16 DIAGNOSIS — Z48.02 ENCOUNTER FOR REMOVAL OF SUTURES: ICD-10-CM

## 2024-12-16 PROCEDURE — ? SUTURE REMOVAL (GLOBAL PERIOD)

## 2024-12-16 ASSESSMENT — LOCATION ZONE DERM: LOCATION ZONE: TRUNK

## 2024-12-16 ASSESSMENT — LOCATION DETAILED DESCRIPTION DERM: LOCATION DETAILED: RIGHT SUPERIOR MEDIAL UPPER BACK

## 2024-12-16 ASSESSMENT — LOCATION SIMPLE DESCRIPTION DERM: LOCATION SIMPLE: RIGHT UPPER BACK

## 2024-12-16 NOTE — PROCEDURE: SUTURE REMOVAL (GLOBAL PERIOD)
Detail Level: Detailed
Add 20728 Cpt? (Important Note: In 2017 The Use Of 16556 Is Being Tracked By Cms To Determine Future Global Period Reimbursement For Global Periods): no
Suture Removal Completed By (Optional): JUVENTINO BERG

## 2024-12-31 ENCOUNTER — OFFICE VISIT (OUTPATIENT)
Dept: URGENT CARE | Facility: PHYSICIAN GROUP | Age: 64
End: 2024-12-31
Payer: COMMERCIAL

## 2024-12-31 VITALS
TEMPERATURE: 98.4 F | RESPIRATION RATE: 16 BRPM | OXYGEN SATURATION: 94 % | BODY MASS INDEX: 31.9 KG/M2 | HEIGHT: 69 IN | WEIGHT: 215.4 LBS | HEART RATE: 78 BPM | DIASTOLIC BLOOD PRESSURE: 72 MMHG | SYSTOLIC BLOOD PRESSURE: 114 MMHG

## 2024-12-31 DIAGNOSIS — J01.90 ACUTE NON-RECURRENT SINUSITIS, UNSPECIFIED LOCATION: ICD-10-CM

## 2024-12-31 DIAGNOSIS — Z86.19 HISTORY OF CANDIDAL VULVOVAGINITIS: ICD-10-CM

## 2024-12-31 DIAGNOSIS — J98.01 BRONCHOSPASM: ICD-10-CM

## 2024-12-31 RX ORDER — PREDNISONE 20 MG/1
40 TABLET ORAL DAILY
Qty: 10 TABLET | Refills: 0 | Status: SHIPPED | OUTPATIENT
Start: 2024-12-31 | End: 2025-01-05

## 2024-12-31 RX ORDER — DEXTROMETHORPHAN HYDROBROMIDE AND PROMETHAZINE HYDROCHLORIDE 15; 6.25 MG/5ML; MG/5ML
5 SYRUP ORAL EVERY 4 HOURS PRN
Qty: 120 ML | Refills: 0 | Status: SHIPPED | OUTPATIENT
Start: 2024-12-31

## 2024-12-31 RX ORDER — DOXYCYCLINE HYCLATE 100 MG
100 TABLET ORAL 2 TIMES DAILY
Qty: 14 TABLET | Refills: 0 | Status: SHIPPED | OUTPATIENT
Start: 2024-12-31 | End: 2025-01-07

## 2024-12-31 RX ORDER — FLUCONAZOLE 150 MG/1
150 TABLET ORAL DAILY
Qty: 1 TABLET | Refills: 0 | Status: SHIPPED | OUTPATIENT
Start: 2024-12-31

## 2024-12-31 ASSESSMENT — ENCOUNTER SYMPTOMS
COUGH: 1
HEMOPTYSIS: 0
WHEEZING: 0
SHORTNESS OF BREATH: 0
SORE THROAT: 1
FEVER: 0
RHINORRHEA: 1
MYALGIAS: 0

## 2024-12-31 ASSESSMENT — COPD QUESTIONNAIRES: COPD: 0

## 2024-12-31 NOTE — RESULT ENCOUNTER NOTE
Ronald Rodriguez,    Great news-your testing was negative.  I sent in a steroid called prednisone that will help resolve your bronchial inflammation more quickly.  You may also take the cough syrup that I sent in as well.  At this point, I suspect that your symptoms are viral in nature. However, if you symptoms fail to improve within 3 to 5 days or your sinus pressure suddenly worsens I do recommend that you begin the antibiotic as prescribed.  This antibiotic is effective in covering both upper and lower respiratory tract infections.      I hope you have a wonderful and safe trip!    Kind regards,  Joe

## 2024-12-31 NOTE — PROGRESS NOTES
Subjective:   Jennifer Mejia is a 64 y.o. female who presents for Cough (X 3 days, sore throat, ear fullness, sinus congestion for 2 weeks, headache.)        Cough  This is a new problem. Episode onset: 3 days. The problem has been gradually worsening. The problem occurs every few minutes. The cough is Productive of sputum. Associated symptoms include ear congestion, nasal congestion, rhinorrhea and a sore throat. Pertinent negatives include no chest pain, ear pain, fever, hemoptysis, myalgias, shortness of breath or wheezing. Associated symptoms comments: Sinus pressure and congestion that has been waxing and waning x 2 weeks. She has tried nothing for the symptoms. The treatment provided no relief. Her past medical history is significant for bronchitis and pneumonia. There is no history of asthma or COPD.     Review of Systems   Constitutional:  Negative for fever.   HENT:  Positive for rhinorrhea and sore throat. Negative for ear pain.    Respiratory:  Positive for cough. Negative for hemoptysis, shortness of breath and wheezing.    Cardiovascular:  Negative for chest pain.   Musculoskeletal:  Negative for myalgias.       PMH:  has a past medical history of Hyperlipidemia.  MEDS:   Current Outpatient Medications:     predniSONE (DELTASONE) 20 MG Tab, Take 2 Tablets by mouth every day for 5 days., Disp: 10 Tablet, Rfl: 0    doxycycline (VIBRAMYCIN) 100 MG Tab, Take 1 Tablet by mouth 2 times a day for 7 days., Disp: 14 Tablet, Rfl: 0    promethazine-dextromethorphan (PROMETHAZINE-DM) 6.25-15 MG/5ML syrup, Take 5 mL by mouth every four hours as needed for Cough., Disp: 120 mL, Rfl: 0    fluconazole (DIFLUCAN) 150 MG tablet, Take 1 Tablet by mouth every day., Disp: 1 Tablet, Rfl: 0    estradiol (VIVELLE-DOT) 0.1 MG/24HR PATCH BIWEEKLY, Place 1 Patch on the skin two times a week., Disp: , Rfl:     escitalopram (LEXAPRO) 10 MG Tab, Take 10 mg by mouth every day., Disp: , Rfl:   ALLERGIES: No Known Allergies  SURGHX:  "  Past Surgical History:   Procedure Laterality Date    ABDOMINAL HYSTERECTOMY TOTAL      CHOLECYSTECTOMY      PRIMARY C SECTION      x2     SOCHX:  reports that she quit smoking about 9 months ago. Her smoking use included cigarettes. She started smoking about 47 years ago. She has a 35.2 pack-year smoking history. She has never used smokeless tobacco. She reports current alcohol use. She reports that she does not use drugs.  FH: Family history was reviewed, no pertinent findings to report   Objective:   /72 (BP Location: Right arm, Patient Position: Sitting, BP Cuff Size: Adult)   Pulse 78   Temp 36.9 °C (98.4 °F) (Temporal)   Resp 16   Ht 1.753 m (5' 9\")   Wt 97.7 kg (215 lb 6.4 oz)   SpO2 94%   BMI 31.81 kg/m²   Physical Exam  Vitals reviewed.   Constitutional:       General: She is not in acute distress.     Appearance: Normal appearance. She is well-developed. She is not toxic-appearing.   HENT:      Head: Normocephalic and atraumatic.      Right Ear: Tympanic membrane, ear canal and external ear normal.      Left Ear: Tympanic membrane, ear canal and external ear normal.      Nose: Congestion and rhinorrhea present.      Mouth/Throat:      Lips: Pink.      Mouth: Mucous membranes are moist.      Pharynx: Oropharynx is clear. Uvula midline.   Cardiovascular:      Rate and Rhythm: Normal rate and regular rhythm.      Heart sounds: Normal heart sounds, S1 normal and S2 normal.   Pulmonary:      Effort: Pulmonary effort is normal. No respiratory distress.      Breath sounds: Normal breath sounds. No stridor. No decreased breath sounds, wheezing, rhonchi or rales.      Comments: Frequent harsh spasmodic cough on exam.  Skin:     General: Skin is dry.   Neurological:      Comments: Alert and oriented.    Psychiatric:         Speech: Speech normal.         Behavior: Behavior normal.           Results for orders placed or performed in visit on 12/31/24   POCT CoV-2, Flu A/B, RSV by PCR    Collection " Time: 12/31/24  8:52 AM   Result Value Ref Range    SARS-CoV-2 by PCR Negative Negative, Invalid    Influenza virus A RNA Negative Negative, Invalid    Influenza virus B, PCR Negative Negative, Invalid    RSV, PCR Negative Negative, Invalid       Assessment/Plan:   1. Acute non-recurrent sinusitis, unspecified location  - POCT CoV-2, Flu A/B, RSV by PCR  - doxycycline (VIBRAMYCIN) 100 MG Tab; Take 1 Tablet by mouth 2 times a day for 7 days.  Dispense: 14 Tablet; Refill: 0    2. Bronchospasm  - predniSONE (DELTASONE) 20 MG Tab; Take 2 Tablets by mouth every day for 5 days.  Dispense: 10 Tablet; Refill: 0  - promethazine-dextromethorphan (PROMETHAZINE-DM) 6.25-15 MG/5ML syrup; Take 5 mL by mouth every four hours as needed for Cough.  Dispense: 120 mL; Refill: 0    3. History of candidal vulvovaginitis  - fluconazole (DIFLUCAN) 150 MG tablet; Take 1 Tablet by mouth every day.  Dispense: 1 Tablet; Refill: 0    Fortunately, patient's viral panel is negative.  At this juncture, I suspect that patient's symptoms are viral in origin.  However as she will be departing the country for [place] in approximately 48 hours, I will prescribe her a contingent prescription for an antibiotic.  She may begin this if her symptoms persist or worsen.  For now, I recommend symptomatic management.  She may begin burst of prednisone and take an antitussive as needed.  Patient should rest and ensure that she is adequately hydrating.  Recommend salt water gargles, hot tea with honey and lozenges as needed for sore throat.  If symptoms fail to improve/resolve despite treatment plan patient should seek medical reevaluation.

## 2025-03-05 ENCOUNTER — HOSPITAL ENCOUNTER (OUTPATIENT)
Dept: LAB | Facility: MEDICAL CENTER | Age: 65
End: 2025-03-05
Attending: FAMILY MEDICINE
Payer: COMMERCIAL

## 2025-03-05 DIAGNOSIS — Z87.891 FORMER SMOKER: ICD-10-CM

## 2025-03-05 DIAGNOSIS — Z11.4 ENCOUNTER FOR SCREENING FOR HIV: ICD-10-CM

## 2025-03-05 DIAGNOSIS — Z11.59 NEED FOR HEPATITIS C SCREENING TEST: ICD-10-CM

## 2025-03-05 DIAGNOSIS — F40.243 ANXIETY WITH FLYING: ICD-10-CM

## 2025-03-05 DIAGNOSIS — E78.5 DYSLIPIDEMIA: ICD-10-CM

## 2025-03-05 DIAGNOSIS — Z79.890 POSTMENOPAUSAL HRT (HORMONE REPLACEMENT THERAPY): ICD-10-CM

## 2025-03-05 DIAGNOSIS — R73.01 ELEVATED FASTING GLUCOSE: ICD-10-CM

## 2025-03-05 LAB
BASOPHILS # BLD AUTO: 1 % (ref 0–1.8)
BASOPHILS # BLD: 0.05 K/UL (ref 0–0.12)
EOSINOPHIL # BLD AUTO: 0.1 K/UL (ref 0–0.51)
EOSINOPHIL NFR BLD: 2 % (ref 0–6.9)
ERYTHROCYTE [DISTWIDTH] IN BLOOD BY AUTOMATED COUNT: 45 FL (ref 35.9–50)
EST. AVERAGE GLUCOSE BLD GHB EST-MCNC: 120 MG/DL
HBA1C MFR BLD: 5.8 % (ref 4–5.6)
HCT VFR BLD AUTO: 43.3 % (ref 37–47)
HGB BLD-MCNC: 13.9 G/DL (ref 12–16)
IMM GRANULOCYTES # BLD AUTO: 0.01 K/UL (ref 0–0.11)
IMM GRANULOCYTES NFR BLD AUTO: 0.2 % (ref 0–0.9)
LYMPHOCYTES # BLD AUTO: 1.3 K/UL (ref 1–4.8)
LYMPHOCYTES NFR BLD: 25.7 % (ref 22–41)
MCH RBC QN AUTO: 29.9 PG (ref 27–33)
MCHC RBC AUTO-ENTMCNC: 32.1 G/DL (ref 32.2–35.5)
MCV RBC AUTO: 93.1 FL (ref 81.4–97.8)
MONOCYTES # BLD AUTO: 0.36 K/UL (ref 0–0.85)
MONOCYTES NFR BLD AUTO: 7.1 % (ref 0–13.4)
NEUTROPHILS # BLD AUTO: 3.23 K/UL (ref 1.82–7.42)
NEUTROPHILS NFR BLD: 64 % (ref 44–72)
NRBC # BLD AUTO: 0 K/UL
NRBC BLD-RTO: 0 /100 WBC (ref 0–0.2)
PLATELET # BLD AUTO: 248 K/UL (ref 164–446)
PMV BLD AUTO: 9.4 FL (ref 9–12.9)
RBC # BLD AUTO: 4.65 M/UL (ref 4.2–5.4)
WBC # BLD AUTO: 5.1 K/UL (ref 4.8–10.8)

## 2025-03-05 PROCEDURE — 83036 HEMOGLOBIN GLYCOSYLATED A1C: CPT

## 2025-03-05 PROCEDURE — 85025 COMPLETE CBC W/AUTO DIFF WBC: CPT

## 2025-03-05 PROCEDURE — 86803 HEPATITIS C AB TEST: CPT

## 2025-03-05 PROCEDURE — 36415 COLL VENOUS BLD VENIPUNCTURE: CPT

## 2025-03-05 PROCEDURE — 80053 COMPREHEN METABOLIC PANEL: CPT

## 2025-03-05 PROCEDURE — 84443 ASSAY THYROID STIM HORMONE: CPT

## 2025-03-05 PROCEDURE — 80061 LIPID PANEL: CPT

## 2025-03-05 PROCEDURE — 87389 HIV-1 AG W/HIV-1&-2 AB AG IA: CPT

## 2025-03-06 ENCOUNTER — RESULTS FOLLOW-UP (OUTPATIENT)
Dept: MEDICAL GROUP | Facility: MEDICAL CENTER | Age: 65
End: 2025-03-06
Payer: COMMERCIAL

## 2025-03-06 LAB
ALBUMIN SERPL BCP-MCNC: 4 G/DL (ref 3.2–4.9)
ALBUMIN/GLOB SERPL: 1.3 G/DL
ALP SERPL-CCNC: 68 U/L (ref 30–99)
ALT SERPL-CCNC: 21 U/L (ref 2–50)
ANION GAP SERPL CALC-SCNC: 11 MMOL/L (ref 7–16)
AST SERPL-CCNC: 19 U/L (ref 12–45)
BILIRUB SERPL-MCNC: 0.3 MG/DL (ref 0.1–1.5)
BUN SERPL-MCNC: 15 MG/DL (ref 8–22)
CALCIUM ALBUM COR SERPL-MCNC: 9.3 MG/DL (ref 8.5–10.5)
CALCIUM SERPL-MCNC: 9.3 MG/DL (ref 8.5–10.5)
CHLORIDE SERPL-SCNC: 106 MMOL/L (ref 96–112)
CHOLEST SERPL-MCNC: 215 MG/DL (ref 100–199)
CO2 SERPL-SCNC: 24 MMOL/L (ref 20–33)
CREAT SERPL-MCNC: 0.82 MG/DL (ref 0.5–1.4)
GFR SERPLBLD CREATININE-BSD FMLA CKD-EPI: 80 ML/MIN/1.73 M 2
GLOBULIN SER CALC-MCNC: 3.2 G/DL (ref 1.9–3.5)
GLUCOSE SERPL-MCNC: 101 MG/DL (ref 65–99)
HCV AB SER QL: NORMAL
HDLC SERPL-MCNC: 55 MG/DL
HIV 1+2 AB+HIV1 P24 AG SERPL QL IA: NORMAL
LDLC SERPL CALC-MCNC: 140 MG/DL
POTASSIUM SERPL-SCNC: 4.2 MMOL/L (ref 3.6–5.5)
PROT SERPL-MCNC: 7.2 G/DL (ref 6–8.2)
SODIUM SERPL-SCNC: 141 MMOL/L (ref 135–145)
TRIGL SERPL-MCNC: 100 MG/DL (ref 0–149)
TSH SERPL DL<=0.005 MIU/L-ACNC: 4.94 UIU/ML (ref 0.38–5.33)

## 2025-04-29 ENCOUNTER — PATIENT MESSAGE (OUTPATIENT)
Facility: MEDICAL CENTER | Age: 65
End: 2025-04-29

## 2025-05-15 ENCOUNTER — TELEPHONE (OUTPATIENT)
Dept: MEDICAL GROUP | Facility: MEDICAL CENTER | Age: 65
End: 2025-05-15

## 2025-05-15 ENCOUNTER — OFFICE VISIT (OUTPATIENT)
Dept: MEDICAL GROUP | Facility: MEDICAL CENTER | Age: 65
End: 2025-05-15
Payer: COMMERCIAL

## 2025-05-15 VITALS
OXYGEN SATURATION: 97 % | HEART RATE: 83 BPM | RESPIRATION RATE: 14 BRPM | WEIGHT: 220 LBS | SYSTOLIC BLOOD PRESSURE: 120 MMHG | HEIGHT: 69 IN | TEMPERATURE: 97.7 F | DIASTOLIC BLOOD PRESSURE: 70 MMHG | BODY MASS INDEX: 32.58 KG/M2

## 2025-05-15 DIAGNOSIS — Z83.3 FAMILY HISTORY OF DIABETES MELLITUS: ICD-10-CM

## 2025-05-15 DIAGNOSIS — E78.00 PURE HYPERCHOLESTEROLEMIA: ICD-10-CM

## 2025-05-15 DIAGNOSIS — E66.811 OBESITY (BMI 30.0-34.9): ICD-10-CM

## 2025-05-15 DIAGNOSIS — Z72.89: ICD-10-CM

## 2025-05-15 DIAGNOSIS — R73.03 PREDIABETES: ICD-10-CM

## 2025-05-15 DIAGNOSIS — R73.03 PREDIABETES: Primary | ICD-10-CM

## 2025-05-15 PROCEDURE — 3074F SYST BP LT 130 MM HG: CPT | Performed by: FAMILY MEDICINE

## 2025-05-15 PROCEDURE — 3078F DIAST BP <80 MM HG: CPT | Performed by: FAMILY MEDICINE

## 2025-05-15 PROCEDURE — 99214 OFFICE O/P EST MOD 30 MIN: CPT | Performed by: FAMILY MEDICINE

## 2025-05-15 RX ORDER — ROSUVASTATIN CALCIUM 10 MG/1
10 TABLET, COATED ORAL EVERY EVENING
Qty: 90 TABLET | Refills: 3 | Status: SHIPPED | OUTPATIENT
Start: 2025-05-15

## 2025-05-15 ASSESSMENT — FIBROSIS 4 INDEX: FIB4 SCORE: 1.07

## 2025-05-15 ASSESSMENT — PATIENT HEALTH QUESTIONNAIRE - PHQ9: CLINICAL INTERPRETATION OF PHQ2 SCORE: 0

## 2025-05-15 NOTE — TELEPHONE ENCOUNTER
Pt called and stated that her insurance did'nt approve the     Tirzepatide-Weight Management 2.5 MG/0.5ML Solution Auto-injector   Pt is wanting to know if there is anything else that it can be changed to.

## 2025-05-15 NOTE — TELEPHONE ENCOUNTER
Talked with pt to let her know that we have sent a different prescription for her. I will try and submit a prior authorization to see if that helps

## 2025-05-15 NOTE — LETTER
[facility]  Vinny Segundo D.O.  [address]  [facility] [place] [address]  Fax: [phone]   Authorization for Release/Disclosure of   Protected Health Information   Name: JENNIFER MEJIA : 1960 SSN: [id number]   Address: [address] [place] [id number] Phone:    There are no phone numbers on file.   I authorize the entity listed below to release/disclose the PHI below to:   [facility]/Vinny Segundo D.O. and Vinny Segundo D.O.   Provider or Entity Name:  [facility]   Address   City, State, Zip   Phone:      Fax:     Reason for request: continuity of care   Information to be released:    [  ] LAST COLONOSCOPY,  including any PATH REPORT and follow-up  [  ] LAST FIT/COLOGUARD RESULT [  ] LAST DEXA  [ [id number] ] LAST MAMMOGRAM  [  ] LAST PAP  [  ] LAST LABS [  ] RETINA EXAM REPORT  [  ] IMMUNIZATION RECORDS  [  ] Release all info      [  ] Check here and initial the line next to each item to release ALL health information INCLUDING  _____ Care and treatment for drug and / or alcohol abuse  _____ HIV testing, infection status, or AIDS  _____ Genetic Testing    DATES OF SERVICE OR TIME PERIOD TO BE DISCLOSED: _____________  I understand and acknowledge that:  * This Authorization may be revoked at any time by you in writing, except if your health information has already been used or disclosed.  * Your health information that will be used or disclosed as a result of you signing this authorization could be re-disclosed by the recipient. If this occurs, your re-disclosed health information may no longer be protected by State or Federal laws.  * You may refuse to sign this Authorization. Your refusal will not affect your ability to obtain treatment.  * This Authorization becomes effective upon signing and will  on (date) __________.      If no date is indicated, this Authorization will  one (1) year from the signature date.    Name: Jennifer Mejia  Signature:  Date:   5/15/2025     PLEASE FAX REQUESTED RECORDS BACK TO: [phone]

## 2025-05-15 NOTE — PROGRESS NOTES
"Verbal consent was acquired by the patient to use [employer] ambient listening note generation during this visit    Subjective:     CC: \"lab follow up and weight management\"    History of Present Illness  The patient presents for evaluation of prediabetes, weight management, and hyperlipidemia.    Prediabetes  She has been diagnosed with prediabetes, with an A1c of 5.8%. This condition also affects her brother and sister, and both her father and mother were diabetic. She has recently undergone an eye examination.  - Onset: Recently diagnosed.  - Character: A1c of 5.8%.  - Family History: Brother and sister also affected; both parents were diabetic.    Weight Management  She reports significant weight gain over the past year, increasing from 202 lbs in 03/2024 to 220 lbs currently. Despite maintaining an active lifestyle, including walking approximately 5 miles three to four times a week and participating in Pilates for the past month, she has not observed any weight loss. She has made dietary modifications, such as reducing sugar intake, monitoring fat consumption, using nonfat creamer in her coffee, limiting butter use, and controlling portion sizes. She is currently on an estrogen patch but is in the process of discontinuing its use.  - Onset: Weight gain over the past year.  - Duration: Weight gain from 03/2024 to present.  - Character: Increased from 202 lbs to 220 lbs.  - Alleviating/Aggravating Factors: Active lifestyle (walking, Pilates), dietary modifications (reducing sugar intake, monitoring fat consumption, using nonfat creamer, limiting butter use, controlling portion sizes), estrogen patch.  - Timing: Active lifestyle maintained; dietary modifications ongoing; discontinuing estrogen patch.    Hyperlipidemia  She has a history of smoking for 35 years but has since quit and now only vapes occasionally. She does not experience any chest pain during physical activities such as walking or exercising.  - " "Onset: History of smoking for 35 years; quit smoking.  - Alleviating/Aggravating Factors: Occasionally vapes; no chest pain during physical activities.    She has completed her mammogram and ultrasound at [facility], both of which yielded normal results. She has previously undergone genetic screening, which showed no significant findings.    SOCIAL HISTORY  The patient has quit smoking but vapes occasionally. She has a 35-year history of cigarette smoking.    FAMILY HISTORY  The patient's brother and sister are prediabetic. Her father and mother were both diabetic. Her grandmother and aunt had breast cancer. There is no family history of thyroid cancer.          Objective:     Exam:  /70   Pulse 83   Temp 36.5 °C (97.7 °F) (Temporal)   Resp 14   Ht 1.753 m (5' 9\")   Wt 99.8 kg (220 lb)   SpO2 97%   BMI 32.49 kg/m²  Body mass index is 32.49 kg/m².    Physical Exam  General Appearance: Normal.  Vital signs: Within normal limits.  HEENT: Within normal limits.  Respiratory: Clear to auscultation, no wheezing, rales or rhonchi.  Cardiovascular: Regular rate and rhythm, no murmurs, rubs, or gallops.  Skin: Warm and dry, no rash.  Neurological: Normal.  Physical Exam        Results  Labs   - GFR: 80   - Hemoglobin: 13.9   - White Blood Cells: 5.1   - Platelets: 248   - Fasting Sugar: 101   - Creatinine: 0.82   - Liver Enzymes: Normal   - A1c: 5.8   - Hep C: Negative   - HIV: Negative   - Total Cholesterol: 215   - Triglycerides: 100   - HDL: 55   - LDL: 140   - TSH: 4.9    Imaging   - Mammogram: Normal   - Ultrasound: Normal      Assessment & Plan:       1. Prediabetes  - Tirzepatide-Weight Management 2.5 MG/0.5ML Solution Auto-injector; Inject 0.5 mL under the skin every 7 days.  Dispense: 2 mL; Refill: 0    2. Obesity (BMI 30.0-34.9)  - Tirzepatide-Weight Management 2.5 MG/0.5ML Solution Auto-injector; Inject 0.5 mL under the skin every 7 days.  Dispense: 2 mL; Refill: 0    3. Pure " hypercholesterolemia  - Tirzepatide-Weight Management 2.5 MG/0.5ML Solution Auto-injector; Inject 0.5 mL under the skin every 7 days.  Dispense: 2 mL; Refill: 0  - rosuvastatin (CRESTOR) 10 MG Tab; Take 1 Tablet by mouth every evening.  Dispense: 90 Tablet; Refill: 3    4. Family history of diabetes mellitus  - Tirzepatide-Weight Management 2.5 MG/0.5ML Solution Auto-injector; Inject 0.5 mL under the skin every 7 days.  Dispense: 2 mL; Refill: 0    5. Current non-nicotine vaping on some days      Assessment & Plan  1. Prediabetes: A1c 5.8.  - Continue current diet and exercise regimen.  - Discussed potential side effects of tirzepatide, including gastrointestinal issues, pancreatitis, and vision loss.  - Initiate tirzepatide 2.5 mg once weekly, with a plan to increase the dosage to 5 mg after one month if tolerated and affordable. Prescription will be sent to pharmacy.    2. Weight management: Gained weight over the past year.  - Discussed potential benefits and side effects of tirzepatide for weight loss.  - Emphasized the importance of monitoring for side effects.  - Initiate tirzepatide 2.5 mg once weekly, with a plan to increase the dosage to 5 mg after one month if tolerated and affordable. Prescription will be sent to pharmacy.    3. Hyperlipidemia: .  - Given smoking history, prediabetes, and family history of diabetes, at increased risk for heart attack and stroke.  - Discussed potential side effects of statins, including muscle soreness and liver enzyme changes.  - Initiate rosuvastatin 10 mg, to be taken in the evening.  - Recommended coronary calcium score test to assess plaque buildup.    4. Health maintenance.  - Due for chest imaging for lung cancer screening.  - Completed mammogram and ultrasound, both yielding normal results.  - Previously undergone genetic screening.    Follow-up  - Follow up in 3 months.         Return in about 3 months (around 8/15/2025), or if symptoms worsen or fail to  improve, for Medication F/U.      This note was created using voice recognition software (Dragon). The accuracy of the dictation is limited by the abilities of the software. I have reviewed the note prior to signing, however some errors in grammar and context are still possible. If you have any questions related to this note please do not hesitate to contact our office.

## 2025-05-19 ENCOUNTER — PATIENT MESSAGE (OUTPATIENT)
Dept: SLEEP MEDICINE | Facility: MEDICAL CENTER | Age: 65
End: 2025-05-19
Payer: COMMERCIAL

## 2025-05-19 DIAGNOSIS — Z87.891 PERSONAL HISTORY OF TOBACCO USE, PRESENTING HAZARDS TO HEALTH: Primary | ICD-10-CM

## 2025-06-03 ENCOUNTER — RESEARCH ENCOUNTER (OUTPATIENT)
Facility: MEDICAL CENTER | Age: 65
End: 2025-06-03
Payer: COMMERCIAL

## 2025-06-03 NOTE — RESEARCH NOTE
[place]: [id number]_PROACT LUNG STUDY    PI: Joe Juarez MD  Study Code: PROACT LUNG [id number]  IRB #: [id number]  NCT #: [id number]  Status: Consented/Enrolled  Study ID #: [id number]    Date of visit: 6/3/2025  Patient name: Jennifer Mejia  Visit Type: Telephone call      Demographics  Height: 69 in.  Weight: 220 lb.  Zip Code: [address]      Respiratory Illnesses and Diseases - Does subject have a new onset of the following conditions, Enter the most recent date of diagnosis in case of multiple diagnoses for an acute condition  Acute respiratory distress syndrome: NO  Pneumonia: NO  Asthma: NO  Chronic obstructive pulmonary disease (COPD): NO  Chronic bronchitis: NO  Emphysema: NO  Bronchiectasis: NO  Interstitial Lung Disease: NO  Pulmonary fibrosis: NO  Sarcoidosis: NO  Silicosis: NO  Asbestosis: NO  Tuberculosis: NO  Diabetes: NO       Current Cancer Status  Status of Primary diagnosis since last visit: no cancer diagnosed      Clinical Notes (for Lung Cancer Management)  Has patient undergone lung cancer therapy? NO  If no, please state why: NO cancer DX      Biomarker Testing History  Has the participant received blood-based testing for cancer-related genetic diseases / syndromes or tumor biomarker's during the follow up period? NO      Smoking History  Has the participant's smoking habit changed since the previous visit? NO    If yes, which of the following describes the participant's cigarette smoking status? Not current but a former smoker    If participant Quit smoking since last visit, when did the participant permanently quit smoking? Quit Prior to enrollment 03-MAR-2024    Since last visit, on average, how many cigarettes did the participant usually smoke per day? N/A    Alcohol Use  Has the participant's alcohol use changed since the previous visit? NO    How often does the participant have a drink containing alcohol? (1 drink = 1 glass of wine/beer or 1 shot of liquor): 2 to 4 times per  month    How many drinks containing alcohol does the participant have on a typical day? 1 or 2    Occupational Exposure  Has the participant had a tenure of 12 months or more in any of the following industries or occupations? None  What is the longest duration that the participant has worked in the industries or occupations selected above? N/A    Does the participant usually wear a facemask or other respiratory protective equipment for their lungs while working? NO    Has the participant experienced any occupational exposure to agents that are recognized as lung-targeting carcinogens? No    If yes, please provide the duration of exposure in years for each agent identified: N/A    Environmental Exposure  Has the participant been exposed to environmental hazards such as chemicals, radon, dust, or air pollutants? NO  If yes, include exposure type (radon) and duration of exposure: N/A    Previous Chest Imaging Reports Upload  Has the participant undergone any form of chest imaging Baseline to Follow up review? NO    Has the participant undergone/ scheduled annual screening LDCT at 12 months review? NO - not yet sched    Has the participant undergone any Routine Chest CT Baseline to Follow up review NO    Specify the date and procedure for previous chest imaging: LDCT Baseline 15-May-2024

## 2025-06-09 ENCOUNTER — TELEPHONE (OUTPATIENT)
Dept: HEALTH INFORMATION MANAGEMENT | Facility: OTHER | Age: 65
End: 2025-06-09
Payer: COMMERCIAL

## 2025-06-13 ENCOUNTER — TELEPHONE (OUTPATIENT)
Dept: MEDICAL GROUP | Facility: MEDICAL CENTER | Age: 65
End: 2025-06-13

## 2025-06-18 ENCOUNTER — TELEPHONE (OUTPATIENT)
Dept: SLEEP MEDICINE | Facility: MEDICAL CENTER | Age: 65
End: 2025-06-18
Payer: COMMERCIAL

## 2025-06-18 NOTE — TELEPHONE ENCOUNTER
Phoned patient with results of LDCT exam performed on 6/16/25.    Notified her that the results showed very small nodule(s) at bilateral lungs that had a benign (or non cancer) appearance.    To make sure these nodule(s) are benign, and remain unchanged, we recommend a follow-up low-dose chest CT in 12 months, which will be ordered per PCP.    Patient informed of incidental findings of 8mm simple liver cyst, nodularity of the anterolateral trachea (stable compared to 5/15/24) with recommendation to follow-up up with PCP.  Patient agrees to all recommendations.    Patient's PCP, Dr. Segundo, was notified of results and incidental findings per this correspondence.    [facility] updated and patient sent lung cancer screening result letter.    Results are in the Media section of Epic.

## 2025-06-18 NOTE — LETTER
[facility]  [address]  [place] [address]  P [phone]  F [phone]     Date: June 18, 2025    Jennifer Mejia  [address] [facility] [address]    Re:  Low-dose chest CT performed on 6/16/25     Medical Record Number: [id number]    Dear Jennifre,    We are writing to let you know that the results of your recent low-dose chest CT (LDCT) examination shows one or more lung nodule(s) which are likely benign (not cancer).  Lung nodules are very common and many people without cancer have these nodules.  To make sure these nodule(s) are benign, and remain unchanged, your radiologist recommends you have another low-dose chest CT on or around 6/17/26.  In the event that any additional “incidental” findings were identified from this exam, we have communicated back to your primary care provider for follow-up.    Here are some other important points you should know:   Your low-dose Chest CT report has been sent to your referring or primary health care provider and is available to participants in [employer].  As a part of our Lung Cancer Screening program we will remind you and your referring health care provider when your next LDCT screening is due.   Although low-dose chest CT is very effective at finding lung cancer early, it cannot find all lung cancers. If you develop any new symptoms such as shortness of breath, chest pain, or coughing up blood, please call your doctor.   Please keep in mind that good health involves quitting smoking (for help, call [facility] Quit Tobacco program at [phone]), an annual physical exam, and continued screening with low-dose chest CT.    Thank you for participating in the Lung Cancer Screening program. If you have any questions about this letter or our program, please call our Outbound Team at [phone].     Sincerely,    Dr. Aishwarya Fields  [facility] Lung Cancer Screening [facility]

## 2025-06-24 ENCOUNTER — HOSPITAL ENCOUNTER (OUTPATIENT)
Dept: RADIOLOGY | Facility: MEDICAL CENTER | Age: 65
End: 2025-06-24
Attending: FAMILY MEDICINE
Payer: COMMERCIAL

## 2025-06-24 ENCOUNTER — HOSPITAL ENCOUNTER (OUTPATIENT)
Dept: RADIOLOGY | Facility: MEDICAL CENTER | Age: 65
End: 2025-06-24
Attending: OBSTETRICS & GYNECOLOGY
Payer: COMMERCIAL

## 2025-07-02 ENCOUNTER — APPOINTMENT (OUTPATIENT)
Dept: MEDICAL GROUP | Facility: MEDICAL CENTER | Age: 65
End: 2025-07-02
Payer: COMMERCIAL

## 2025-07-02 ENCOUNTER — HOSPITAL ENCOUNTER (OUTPATIENT)
Facility: MEDICAL CENTER | Age: 65
End: 2025-07-02
Attending: FAMILY MEDICINE
Payer: COMMERCIAL

## 2025-07-02 VITALS
SYSTOLIC BLOOD PRESSURE: 122 MMHG | TEMPERATURE: 98.2 F | HEART RATE: 87 BPM | BODY MASS INDEX: 32.26 KG/M2 | DIASTOLIC BLOOD PRESSURE: 66 MMHG | RESPIRATION RATE: 16 BRPM | HEIGHT: 69 IN | OXYGEN SATURATION: 97 % | WEIGHT: 217.8 LBS

## 2025-07-02 DIAGNOSIS — Z79.899 MEDICATION MANAGEMENT: ICD-10-CM

## 2025-07-02 DIAGNOSIS — R91.1 PULMONARY NODULE: ICD-10-CM

## 2025-07-02 DIAGNOSIS — E66.811 OBESITY (BMI 30.0-34.9): ICD-10-CM

## 2025-07-02 DIAGNOSIS — E66.811 OBESITY (BMI 30.0-34.9): Primary | ICD-10-CM

## 2025-07-02 DIAGNOSIS — M79.89 LEG SWELLING: ICD-10-CM

## 2025-07-02 DIAGNOSIS — E78.00 PURE HYPERCHOLESTEROLEMIA: ICD-10-CM

## 2025-07-02 PROCEDURE — 3074F SYST BP LT 130 MM HG: CPT | Performed by: FAMILY MEDICINE

## 2025-07-02 PROCEDURE — 3078F DIAST BP <80 MM HG: CPT | Performed by: FAMILY MEDICINE

## 2025-07-02 PROCEDURE — 99214 OFFICE O/P EST MOD 30 MIN: CPT | Performed by: FAMILY MEDICINE

## 2025-07-02 PROCEDURE — 80307 DRUG TEST PRSMV CHEM ANLYZR: CPT

## 2025-07-02 RX ORDER — PHENTERMINE HYDROCHLORIDE 15 MG/1
15 CAPSULE ORAL EVERY MORNING
Qty: 30 CAPSULE | Refills: 0 | Status: SHIPPED | OUTPATIENT
Start: 2025-07-02 | End: 2025-08-01

## 2025-07-02 RX ORDER — PHENTERMINE HYDROCHLORIDE 30 MG/1
30 CAPSULE ORAL EVERY MORNING
Qty: 30 CAPSULE | Refills: 0 | Status: SHIPPED | OUTPATIENT
Start: 2025-08-01 | End: 2025-08-31

## 2025-07-02 RX ORDER — PHENTERMINE HYDROCHLORIDE 30 MG/1
30 CAPSULE ORAL EVERY MORNING
Qty: 30 CAPSULE | Refills: 0 | Status: SHIPPED | OUTPATIENT
Start: 2025-08-31 | End: 2025-09-30

## 2025-07-02 ASSESSMENT — FIBROSIS 4 INDEX: FIB4 SCORE: 1.07

## 2025-07-02 NOTE — PROGRESS NOTES
"Verbal consent was acquired by the patient to use [employer] ambient listening note generation during this visit    Subjective:     CC: \"weight loss medication, lab review\"    History of Present Illness  The patient presents for a medication update.    Weight Loss Medication  She has been unable to obtain Wegovy due to insurance issues. Despite maintaining a healthy diet and regular exercise, she has lost only 3 pounds since 05/2025. She is seeking a weight loss medication that will yield noticeable results and is considering the use of phentermine.  - Onset: Since 05/2025.  - Duration: Persistent despite healthy diet and regular exercise.  - Character: Difficulty obtaining Wegovy; considering phentermine.  - Severity: Lost only 3 pounds.    Cholesterol Management  She has not yet started her cholesterol medication, despite having collected it from the pharmacy. She hopes to reduce her weight through increased physical activity, which she believes will also help lower her cholesterol levels. Her cholesterol levels have been gradually increasing over the past 12 years, with the most recent total cholesterol at 215 and LDL at 140.  - Onset: Gradually increasing over the past 12 years.  - Character: Total cholesterol at 215 and LDL at 140.  - Alleviating Factors: Hopes to reduce weight through increased physical activity.  - Severity: Not yet started cholesterol medication.    Leg Swelling  She recently returned from a trip to [place] where she experienced leg swelling during the flight. She wore compression socks and reports no pain or itching. The swelling has since improved.  - Onset: During flight from [place].  - Location: Legs.  - Character: Swelling.  - Alleviating Factors: Wore compression socks.  - Severity: No pain or itching; swelling has improved.          Objective:     Exam:  /66 (BP Location: Left arm, Patient Position: Sitting, BP Cuff Size: Adult)   Pulse 87   Temp 36.8 °C (98.2 °F) (Temporal)  " " Resp 16   Ht 1.753 m (5' 9\")   Wt 98.8 kg (217 lb 12.8 oz)   SpO2 97%   BMI 32.16 kg/m²  Body mass index is 32.16 kg/m².    Physical Exam  General Appearance: Normal.  Vital signs: Within normal limits.  HEENT: Within normal limits.  Respiratory: Within normal limits.  Cardiovascular: Pulse rate is 87.  Extremities: No pain or itching in the legs. Mild swelling noted, likely due to prolonged travel and heat exposure. Small cut observed.  Skin: No signs of infection in the legs.  Neurological: Normal.  Physical Exam        Results  Labs   - Total Cholesterol: 203 twelve years ago, 224 eight years ago, and most recently 215   - LDL: 132 eight years ago and most recently 140    Imaging   - CT lung cancer screening: Stable benign pulmonary nodules and no significant calcified coronary artery disease      Assessment & Plan:       1. Obesity (BMI 30.0-34.9)  - URINE DRUG SCREEN; Future  - Controlled Substance Treatment Agreement  - phentermine 15 MG capsule; Take 1 Capsule by mouth every morning for 30 days.  Dispense: 30 Capsule; Refill: 0  - phentermine 30 MG capsule; Take 1 Capsule by mouth every morning for 30 days.  Dispense: 30 Capsule; Refill: 0  - phentermine 30 MG capsule; Take 1 Capsule by mouth every morning for 30 days. Indications: OBESITY  Dispense: 30 Capsule; Refill: 0    2. Medication management  - URINE DRUG SCREEN; Future  - Controlled Substance Treatment Agreement  - phentermine 15 MG capsule; Take 1 Capsule by mouth every morning for 30 days.  Dispense: 30 Capsule; Refill: 0  - phentermine 30 MG capsule; Take 1 Capsule by mouth every morning for 30 days.  Dispense: 30 Capsule; Refill: 0  - phentermine 30 MG capsule; Take 1 Capsule by mouth every morning for 30 days. Indications: OBESITY  Dispense: 30 Capsule; Refill: 0    3. Pure hypercholesterolemia    4. Leg swelling    5. Pulmonary nodule      Assessment & Plan  1. Weight management: Stable.  - Weight loss of approximately 3 pounds since " 05/2025.  - Various weight loss clinics discussed, including those offering surgical and medicinal interventions; compounding pharmacies explored.  - Informed about Mounjaro availability at [facility] and direct purchase from [employer].  - Prescription for phentermine 15 mg daily for the first month provided, with instructions to increase to 30 mg daily if well-tolerated.  - Urine drug screen and controlled substance agreement ordered.    2. Hypercholesterolemia: Chronic.  - Gradual increase in cholesterol levels over time: total cholesterol 203 twelve years ago, 224 eight years ago, and 215 most recently; LDL increased from 132 to 140.  - Advised to start cholesterol medication to stabilize plaques and reduce heart attack/stroke risk; emphasized importance despite dislike for medicines.  - Weight loss might improve cholesterol levels over time but not quickly.  - Blood work to monitor liver enzymes and cholesterol levels will be ordered during next visit in fall.    3. Leg swelling: Improving.  - Reported leg swelling during recent trip to [place], attributed to heat and long airplane ride; wore compression socks.  - Swelling is improving and does not appear to be an infection and doubt DVT as well.  - Advised that continued improvement should not be a cause for concern.    4. Health maintenance: Stable.  - Last CT lung cancer screening showed stable benign pulmonary nodules and no significant calcified coronary artery disease; annual screenings to continue.  - Recent mammogram was negative.  - Labs completed in 03/2025; no additional labs needed until fall.    Follow-up  - Follow-up visit scheduled for 3 months from now.         Return in about 3 months (around 10/2/2025), or if symptoms worsen or fail to improve, for Controlled Substance, Lifestyle.      This note was created using voice recognition software (Dragon). The accuracy of the dictation is limited by the abilities of the software. I  have reviewed the note prior to signing, however some errors in grammar and context are still possible. If you have any questions related to this note please do not hesitate to contact our office.

## 2025-07-04 LAB
AMPHET CTO UR CFM-MCNC: NEGATIVE NG/ML
BARBITURATES CTO UR CFM-MCNC: NEGATIVE NG/ML
BENZODIAZ CTO UR CFM-MCNC: NEGATIVE NG/ML
CANNABINOIDS CTO UR CFM-MCNC: NEGATIVE NG/ML
COCAINE CTO UR CFM-MCNC: NEGATIVE NG/ML
CREAT UR-MCNC: 146.2 MG/DL (ref 20–400)
DRUG COMMENT 753798: NORMAL
METHADONE CTO UR CFM-MCNC: NEGATIVE NG/ML
OPIATES CTO UR CFM-MCNC: NEGATIVE NG/ML
PCP CTO UR CFM-MCNC: NEGATIVE NG/ML
PROPOXYPH CTO UR CFM-MCNC: NEGATIVE NG/ML

## 2025-07-07 ENCOUNTER — RESULTS FOLLOW-UP (OUTPATIENT)
Dept: MEDICAL GROUP | Facility: MEDICAL CENTER | Age: 65
End: 2025-07-07
Payer: COMMERCIAL

## 2025-08-04 ENCOUNTER — TELEPHONE (OUTPATIENT)
Dept: MEDICAL GROUP | Facility: MEDICAL CENTER | Age: 65
End: 2025-08-04
Payer: COMMERCIAL

## 2025-08-04 DIAGNOSIS — E66.811 OBESITY (BMI 30.0-34.9): ICD-10-CM

## 2025-08-04 DIAGNOSIS — Z79.899 MEDICATION MANAGEMENT: ICD-10-CM

## 2025-08-04 RX ORDER — PHENTERMINE HYDROCHLORIDE 30 MG/1
30 CAPSULE ORAL EVERY MORNING
Qty: 30 CAPSULE | Refills: 0 | Status: SHIPPED | OUTPATIENT
Start: 2025-08-04 | End: 2025-09-03